# Patient Record
Sex: FEMALE | Race: BLACK OR AFRICAN AMERICAN | Employment: FULL TIME | URBAN - METROPOLITAN AREA
[De-identification: names, ages, dates, MRNs, and addresses within clinical notes are randomized per-mention and may not be internally consistent; named-entity substitution may affect disease eponyms.]

---

## 2017-12-21 ENCOUNTER — OFFICE VISIT (OUTPATIENT)
Dept: FAMILY MEDICINE CLINIC | Age: 47
End: 2017-12-21

## 2017-12-21 VITALS
WEIGHT: 194.8 LBS | OXYGEN SATURATION: 99 % | RESPIRATION RATE: 20 BRPM | SYSTOLIC BLOOD PRESSURE: 183 MMHG | HEIGHT: 67 IN | TEMPERATURE: 98.1 F | HEART RATE: 86 BPM | BODY MASS INDEX: 30.57 KG/M2 | DIASTOLIC BLOOD PRESSURE: 91 MMHG

## 2017-12-21 DIAGNOSIS — M54.2 NECK PAIN: ICD-10-CM

## 2017-12-21 DIAGNOSIS — M54.41 ACUTE BILATERAL LOW BACK PAIN WITH RIGHT-SIDED SCIATICA: ICD-10-CM
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
## 2017-12-21 DIAGNOSIS — R20.0 NUMBNESS AND TINGLING OF RIGHT ARM AND LEG: ICD-10-CM

## 2017-12-21 DIAGNOSIS — R20.2 NUMBNESS AND TINGLING OF RIGHT ARM AND LEG: ICD-10-CM

## 2017-12-21 PROBLEM — I10 ESSENTIAL HYPERTENSION: Status: ACTIVE | Noted: 2017-12-21

## 2017-12-21 RX ORDER — CLONIDINE 0.2 MG/24H
1 PATCH, EXTENDED RELEASE TRANSDERMAL
COMMUNITY
End: 2018-09-24 | Stop reason: SDUPTHER

## 2017-12-21 RX ORDER — CYCLOBENZAPRINE HCL 10 MG
TABLET ORAL
COMMUNITY
End: 2018-01-02 | Stop reason: SDUPTHER

## 2017-12-21 RX ORDER — MELOXICAM 15 MG/1
15 TABLET ORAL DAILY
Qty: 30 TAB | Refills: 0 | Status: SHIPPED | OUTPATIENT
Start: 2017-12-21 | End: 2018-01-02

## 2017-12-21 RX ORDER — TRAMADOL HYDROCHLORIDE 50 MG/1
50 TABLET ORAL
COMMUNITY
End: 2018-01-02 | Stop reason: SDUPTHER

## 2017-12-21 RX ORDER — NEBIVOLOL 10 MG/1
TABLET ORAL DAILY
COMMUNITY
End: 2018-09-24 | Stop reason: SDUPTHER

## 2017-12-21 NOTE — MR AVS SNAPSHOT
Visit Information Date & Time Provider Department Dept. Phone Encounter #  
 12/21/2017  1:40 PM Tarah Mcgill NP [facility] [phone] [id number] Upcoming Health Maintenance Date Due DTaP/Tdap/Td series (1 - Tdap) 2/19/1991 PAP AKA CERVICAL CYTOLOGY 2/19/1991 Influenza Age 5 to Adult 8/1/2017 Allergies as of 12/21/2017  Review Complete On: 12/21/2017 By: Dayo Cartagena LPN No Known Allergies Current Immunizations  Never Reviewed No immunizations on file. Not reviewed this visit You Were Diagnosed With   
  
 Codes Comments Acute bilateral low back pain with right-sided sciatica    -  Primary ICD-10-CM: M54.41 
ICD-9-CM: 724.2, 724.3, 338.19 Vitals BP Pulse Temp Resp Height(growth percentile) Weight(growth percentile) (!) 183/91 (BP 1 Location: Left arm, BP Patient Position: Sitting) 86 98.1 °F (36.7 °C) (Oral) 20 5' 6.5\" (1.689 m) 194 lb 12.8 oz (88.4 kg) LMP SpO2 BMI OB Status Smoking Status 09/10/2004 99% 30.97 kg/m2 Hysterectomy Never Smoker BMI and BSA Data Body Mass Index Body Surface Area 30.97 kg/m 2 2.04 m 2 Preferred Pharmacy Pharmacy Name Phone [id number] [phone] Your Updated Medication List  
  
   
This list is accurate as of: 12/21/17  2:45 PM.  Always use your most recent med list.  
  
  
  
  
 BYSTOLIC 10 mg tablet Generic drug:  nebivolol Take  by mouth daily. cloNIDine 0.2 mg/24 hr patch Commonly known as:  CATAPRES  
1 Patch by TransDERmal route Every Saturday. cyclobenzaprine 10 mg tablet Commonly known as:  FLEXERIL Take  by mouth three (3) times daily as needed for Muscle Spasm(s). meloxicam 15 mg tablet Commonly known as:  MOBIC Take 1 Tab by mouth daily. spironolactone 25 mg tablet Commonly known as:  ALDACTONE  
 Take 1 Tab by mouth two (2) times a day. For high blood pressure and low potassium  
  
 traMADol 50 mg tablet Commonly known as:  ULTRAM  
Take 50 mg by mouth every six (6) hours as needed for Pain. Prescriptions Sent to Pharmacy Refills  
 meloxicam (MOBIC) 15 mg tablet 0 Sig: Take 1 Tab by mouth daily. Class: Normal  
 Pharmacy: [facility] #: [phone] Route: Oral  
  
Patient Instructions 1) For acute pain, rest, intermittent application of cold packs (later, may switch to heat after 48 hours). Moist heat (such as shower, no dry heat packs) to affected area tid x 20 minutes. Can take 20 minute warm, epsom salt bath ([employer] carries a good one called Dr. Gwen Moscoso for about $6), if you are safe getting in and out of tub. 
 
2. Relative rest.  Avoid strenuous lifting, pushing, pulling. Avoid twisting and bending. 3. Back stretching and strengthening exercises. Please do the following exercises at least twice a day to help keep your back healthy (after your back is feeling better): Windmill stretch: Lie flat on back, arms extended from shoulders, bend one leg at knee, put bent knee over body, look opposite direction. Hold stretch for 10 seconds. Repeat on opposite side. Do this in morning - either while still in bed or when you first get up, on floor and again at night, before going to sleep. Lie on the back with both feet flat on the floor and both knees bent. Pull the right knee up to the chest, grasp the knee with the left hand and pull it towards the left shoulder and hold the stretch. Repeat for each side. Lie on the back with both feet flat on the floor and both knees bent. Rest the ankle of the right leg over the knee of the left leg. Pull the left thigh toward the chest and hold the stretch. Repeat for each side.  
 
4. You have a prescription for meloxicam (Mobic)  This is a prescription NSAID. Take 1 pill (15mg) daily in the morning with food to avoid stomach upset. Please do not take other NSAIDs, such as ibuprofen, while taking this medication. 5. Light duty next week when you return to work - no lifting more than 10 lbs; and no more than 20lbs for the following week so your muscles have time to heal. 
 
6. A mild TBI or concussion is an injury to the brain that may result after blunt force or an acceleration/deceleration head injury. You need to rest your brain. Please stop using electronics - such as computers, tv, phones - until the headache goes away. Get plenty of sleep and eat a healthy diet. Signs to watch for: 
Problems could arise over the first 24-48 hours. You should not be left alone and must go to a hospital at once it you: 1. Have a headache that gets worse. 2. Are very drowsy or cant be awakened (woken up). 3. Cant recognize people or places. 4. Have repeated vomiting 5. Behave unusually or seem confused; are very irritable. 6. Have seizures (arms and legs jerk uncontrollably). 7. Are unsteady on your feet; have slurred speech; vision or hearing changes. If you still have a headache that is 6-7/10 pain next week, please let me know and I will refer you to Dr. Too Sandoval for concussion protocol. Call or return to clinic if these symptoms worsen or fail to improve as anticipated. Introducing [facility]! Stacie Ardon introduces [employer] patient portal. Now you can access parts of your medical record, email your doctor's office, and request medication refills online. 1. In your internet browser, go to [email or web address] 2. Click on the First Time User? Click Here link in the Sign In box. You will see the New Member Sign Up page. 3. Enter your [employer] Access Code exactly as it appears below. You will not need to use this code after youve completed the sign-up process.  If you do not sign up before the expiration date, you must request a new code. · [employer] Access Code: [email or web address] Expires: 3/21/2018  2:45 PM 
 
4. Enter the last four digits of your Social Security Number (xxxx) and Date of Birth (mm/dd/yyyy) as indicated and click Submit. You will be taken to the next sign-up page. 5. Create a [employer] ID. This will be your [employer] login ID and cannot be changed, so think of one that is secure and easy to remember. 6. Create a [employer] password. You can change your password at any time. 7. Enter your Password Reset Question and Answer. This can be used at a later time if you forget your password. 8. Enter your e-mail address. You will receive e-mail notification when new information is available in [address] 9. Click Sign Up. You can now view and download portions of your medical record. 10. Click the Download Summary menu link to download a portable copy of your medical information. If you have questions, please visit the Frequently Asked Questions section of the [employer] website. Remember, [employer] is NOT to be used for urgent needs. For medical emergencies, dial 911. Now available from your iPhone and Android! Please provide this summary of care documentation to your next provider. Your primary care clinician is listed as Singh Odonnell. If you have any questions after today's visit, please call [phone].
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
## 2017-12-21 NOTE — LETTER
12/21/2017 2:41 PM 
 
 
Re: Chloe Allison To Whom It May Concern: 
 
Please excuse Chloe Allison from work through December 23, 2017. She was seen in our office today for a medical issue. 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 From 1/1/2018 - 1/5/2018, she should be on light duty, lifting no more than 20lbs of weight. Thank you for your assistance in this matter. Sincerely, Chula Mcfadden NP

## 2017-12-21 NOTE — PATIENT INSTRUCTIONS
1) For acute pain, rest, intermittent application of cold packs (later, may switch to heat after 48 hours). Moist heat (such as shower, no dry heat packs) to affected area tid x 20 minutes. Can take 20 minute warm, epsom salt bath ([employer] carries a good one called Dr. Duke Torres for about $6), if you are safe getting in and out of tub.    2. Relative rest.  Avoid strenuous lifting, pushing, pulling. Avoid twisting and bending. 3. Back stretching and strengthening exercises. Please do the following exercises at least twice a day to help keep your back healthy (after your back is feeling better): Windmill stretch: Lie flat on back, arms extended from shoulders, bend one leg at knee, put bent knee over body, look opposite direction. Hold stretch for 10 seconds. Repeat on opposite side. Do this in morning - either while still in bed or when you first get up, on floor and again at night, before going to sleep. Lie on the back with both feet flat on the floor and both knees bent. Pull the right knee up to the chest, grasp the knee with the left hand and pull it towards the left shoulder and hold the stretch. Repeat for each side. Lie on the back with both feet flat on the floor and both knees bent. Rest the ankle of the right leg over the knee of the left leg. Pull the left thigh toward the chest and hold the stretch. Repeat for each side. 4. You have a prescription for meloxicam (Mobic)  This is a prescription NSAID. Take 1 pill (15mg) daily in the morning with food to avoid stomach upset. Please do not take other NSAIDs, such as ibuprofen, while taking this medication. Stop taking Tramadol. 5. Light duty next week when you return to work - no lifting more than 10 lbs; and no more than 20lbs for the following week so your muscles have time to heal.    6. A mild TBI or concussion is an injury to the brain that may result after blunt force or an acceleration/deceleration head injury.     You need to rest your brain. Please stop using electronics - such as computers, tv, phones - until the headache goes away. Get plenty of sleep and eat a healthy diet. Signs to watch for:  Problems could arise over the first 24-48 hours. You should not be left alone and must go to a hospital at once it you:  1. Have a headache that gets worse. 2. Are very drowsy or cant be awakened (woken up). 3. Cant recognize people or places. 4. Have repeated vomiting  5. Behave unusually or seem confused; are very irritable. 6. Have seizures (arms and legs jerk uncontrollably). 7. Are unsteady on your feet; have slurred speech; vision or hearing changes. If you still have a headache that is 6-7/10 pain next week, please let me know and I will refer you to Dr. Faiza Morelos for concussion protocol. Call or return to clinic if these symptoms worsen or fail to improve as anticipated.

## 2018-01-02 ENCOUNTER — OFFICE VISIT (OUTPATIENT)
Dept: FAMILY MEDICINE CLINIC | Age: 48
End: 2018-01-02

## 2018-01-02 VITALS
HEIGHT: 67 IN | HEART RATE: 70 BPM | WEIGHT: 200.9 LBS | SYSTOLIC BLOOD PRESSURE: 189 MMHG | TEMPERATURE: 99.2 F | DIASTOLIC BLOOD PRESSURE: 102 MMHG | BODY MASS INDEX: 31.53 KG/M2 | OXYGEN SATURATION: 99 % | RESPIRATION RATE: 20 BRPM

## 2018-01-02 DIAGNOSIS — M54.41 ACUTE BILATERAL LOW BACK PAIN WITH RIGHT-SIDED SCIATICA: ICD-10-CM

## 2018-01-02 DIAGNOSIS — G44.311 INTRACTABLE ACUTE POST-TRAUMATIC HEADACHE: ICD-10-CM
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
## 2018-01-02 RX ORDER — CYCLOBENZAPRINE HCL 10 MG
10 TABLET ORAL
Qty: 30 TAB | Refills: 0 | Status: SHIPPED | OUTPATIENT
Start: 2018-01-02 | End: 2018-09-24 | Stop reason: ALTCHOICE

## 2018-01-02 RX ORDER — TRAMADOL HYDROCHLORIDE 50 MG/1
50 TABLET ORAL
Qty: 30 TAB | Refills: 0 | Status: SHIPPED | OUTPATIENT
Start: 2018-01-02 | End: 2018-09-24 | Stop reason: ALTCHOICE
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
## 2018-01-02 NOTE — LETTER
1/2/2018 4:20 PM 
 
 
Re: Agata Ferrari To Whom It May Concern: 
 
Agata Ferrari is under my medical care for a continuing issue. She will need to continue light duty at work from 1/2/2018 - 1/22/2018, meaning no lifting more than 10 lbs of weight and not bending or twisting. Thank you for your assistance in this matter. Sincerely,  SALINAS Pierce
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
## 2018-01-12 DIAGNOSIS — R53.83 FATIGUE, UNSPECIFIED TYPE: ICD-10-CM

## 2018-01-12 DIAGNOSIS — R42 DIZZINESS: Primary | ICD-10-CM

## 2018-03-19 ENCOUNTER — HOSPITAL ENCOUNTER (OUTPATIENT)
Dept: ULTRASOUND IMAGING | Age: 48
Discharge: HOME | End: 2018-03-19
Attending: ORTHOPAEDIC SURGERY
Payer: COMMERCIAL

## 2018-03-19 DIAGNOSIS — M79.89 SWELLING OF LIMB: ICD-10-CM

## 2018-03-19 PROCEDURE — 93970 EXTREMITY STUDY: CPT

## 2018-09-18 LAB — MAMMOGRAPHY, EXTERNAL: NORMAL

## 2018-09-24 ENCOUNTER — OFFICE VISIT (OUTPATIENT)
Dept: FAMILY MEDICINE CLINIC | Age: 48
End: 2018-09-24

## 2018-09-24 VITALS
BODY MASS INDEX: 33.12 KG/M2 | DIASTOLIC BLOOD PRESSURE: 90 MMHG | SYSTOLIC BLOOD PRESSURE: 158 MMHG | WEIGHT: 211 LBS | RESPIRATION RATE: 20 BRPM | HEART RATE: 88 BPM | TEMPERATURE: 98.1 F | HEIGHT: 67 IN

## 2018-09-24 DIAGNOSIS — M54.41 ACUTE BILATERAL LOW BACK PAIN WITH RIGHT-SIDED SCIATICA: ICD-10-CM

## 2018-09-24 DIAGNOSIS — E87.6 HYPOKALEMIA: ICD-10-CM

## 2018-09-24 DIAGNOSIS — I10 ESSENTIAL HYPERTENSION: ICD-10-CM

## 2018-09-24 RX ORDER — NEBIVOLOL 5 MG/1
10 TABLET ORAL DAILY
Qty: 180 TAB | Refills: 3 | Status: SHIPPED | OUTPATIENT
Start: 2018-09-24 | End: 2018-09-30

## 2018-09-24 RX ORDER — CLONIDINE 0.2 MG/24H
1 PATCH, EXTENDED RELEASE TRANSDERMAL
Qty: 12 PATCH | Refills: 3 | Status: SHIPPED | OUTPATIENT
Start: 2018-09-29

## 2018-09-24 RX ORDER — NEBIVOLOL 10 MG/1
10 TABLET ORAL DAILY
Qty: 90 TAB | Refills: 3 | Status: SHIPPED | OUTPATIENT
Start: 2018-09-24 | End: 2018-09-24 | Stop reason: SDUPTHER

## 2018-09-24 RX ORDER — SPIRONOLACTONE 25 MG/1
25 TABLET ORAL DAILY
Qty: 90 TAB | Refills: 1 | Status: SHIPPED | OUTPATIENT
Start: 2018-09-24 | End: 2019-09-12 | Stop reason: SDUPTHER

## 2018-09-24 NOTE — PATIENT INSTRUCTIONS
1) Healthy Weight  Body Mass Index is a noninvasive way to screen for weight and body fat. This is a mathematical calculation based on your height and weight. A healthy BMI is between 20% -24.9%. Your BMI is 33%. There is a relationship between high BMI and various healthy problems, such as osteoarthritis, muscle pain, increased risk of cancer, diabetes, heart disease, stroke, hypertension, high cholesterol, sleep apnea, breathing problems, depression, which is why a healthy BMI is so important. In terms of weight loss, a 5-10% reduction in body weight over 3-6 months is a reasonable goal.  There would likely be improvements in risk for disease such as diabetes and heart disease, with this amount of weight loss. In order to lose weight, try reducing your daily intake of calories by decreasing portion size of food and increase exercise to help reduce weight. Eat 3-5 small meals per day instead of 3 large meals. Choose lean meats for protein source which include chicken, pork, and turkey. The recommended serving size for protein is a 2-3 oz serving (the size of your fist), and 1-1.5 oz of carbohydrate per meal (about 1 cup). Increase servings of fruits and vegetables. Limit processed carbohydrates, (i.e. most breads, crackers, pasta, chips, rice, breaded or battered food, etc). If you choose to eat carbohydrates, whole wheat, (instead of white) is a healthier option for bread, rice, and crackers. Avoid fried foods. Limit sugar. Do your best to avoid all sweetened beverages, such as alcohol, juice, sodas or sweet teas, drink water, unsweet tea, diet soda, or crystal light as options instead. (Don't drink more than 2 of the 12oz artificially sweetened drinks per day as these can increase hunger and make it more difficult to lose weight. The daily goal for water intake should be at least 64 oz/day for most people. Fair Life milk is a healthy choice in milk products.  It is double filtered to remove much of the lactose (sugar found in milk) and recommended by trainers and nutritionists. There is 13 g of protein in a serving, so it is an easy way to increase your protein and calcium intake. Daily exercise will also help with weight loss and overall health. A minimum of 150 minutes a week of moderate exercise is recommended (30 minutes per day). Make exercise a routine part of your day - for example, park in spaces far away from [facility], take stairs instead of elevator, if sitting for long periods, get up from chair and walk every hour. Recruit a friend or relative to exercise with you and keep you on schedule. It is much easier to exercise with a ольга who will make sure you work out each day! Home DVDs can be a great way to work out, if you will do them (otherwise, they aren't worth the money!) APR is a eight week mixed martial arts (MMA) based workout. It has 5 main workout DVDs, each one is 45 minutes consisting of a 10 minute warm-up, five 5 minute workouts and a 6 minute stretching/cool down. It is easy to follow, with options to modify each move and you only need hand weights and some space to do the work out. Meal planning smart phone applications like [employer] can help plan healthy meals. Get 7-8 hours of sleep each night. For reliable dietary information, go to www. EATRIGHT.org.    You may wish to consider seeing the nutritionist at [facility] ([phone]), [facility] ([phone]) or [facility] ([phone]). Free smart phone application to help manage weight loss: MyFitnessPal = tracks food intake, exercise and weight. Daily nutritional summary. Meal        2) Bystolic 5mg pills are covered by insurance - so will change rx to 2 tabs of 5mg daily to total 25QO bystolic daily  Current therapy: bystolic 54OR daily + clonidie 0.2mg patch + spironolactone 25mg daily   (The spironolactone 25mg was written for twice a day. Monitor BP and if diastolic (bottom) number is increasing, take spironolactone 25mg twice a day.)    Please check your blood pressure through the week at staggered times in the day. (If you check in the morning, it should be at least one hour after your morning blood pressure medications.)      Arm monitors are most accurate. If you use a wrist monitor, make sure your wrist is at heart level. You can bring your home monitor to your next visit and have it calibrated with the machine in the office to gauge your readings. Sit  with your feet uncrossed and relax for 5 minutes before taking your BP. Keep a written record of your blood pressure readings and bring it to each appointment. If your systolic (top) blood pressure is consistently greater than 140mmHg or less than [id number] of the diastolic (bottom) number is consistently greater than 90mmHg or less than 60mmHg then please schedule an office appointment. Cardiac symptoms that would need immediate attention include: uncomfortable pressure, squeezing, fullness or pain in the center of your chest. Pain or discomfort in one or both arms, the back, neck, jaw or stomach. Shortness of breath with or without chest discomfort, breaking out in a cold sweat, nausea or lightheadedness. \ho           DASH Diet: Care Instructions  Your Care Instructions    The DASH diet is an eating plan that can help lower your blood pressure. DASH stands for Dietary Approaches to Stop Hypertension. Hypertension is high blood pressure. The DASH diet focuses on eating foods that are high in calcium, potassium, and magnesium. These nutrients can lower blood pressure. The foods that are highest in these nutrients are fruits, vegetables, low-fat dairy products, nuts, seeds, and legumes. But taking calcium, potassium, and magnesium supplements instead of eating foods that are high in those nutrients does not have the same effect.  The DASH diet also includes whole grains, fish, and poultry. The DASH diet is one of several lifestyle changes your doctor may recommend to lower your high blood pressure. Your doctor may also want you to decrease the amount of sodium in your diet. Lowering sodium while following the DASH diet can lower blood pressure even further than just the DASH diet alone. Follow-up care is a key part of your treatment and safety. Be sure to make and go to all appointments, and call your doctor if you are having problems. It's also a good idea to know your test results and keep a list of the medicines you take. How can you care for yourself at home? Following the DASH diet  · Eat 4 to 5 servings of fruit each day. A serving is 1 medium-sized piece of fruit, ½ cup chopped or canned fruit, 1/4 cup dried fruit, or 4 ounces (½ cup) of fruit juice. Choose fruit more often than fruit juice. · Eat 4 to 5 servings of vegetables each day. A serving is 1 cup of lettuce or raw leafy vegetables, ½ cup of chopped or cooked vegetables, or 4 ounces (½ cup) of vegetable juice. Choose vegetables more often than vegetable juice. · Get 2 to 3 servings of low-fat and fat-free dairy each day. A serving is 8 ounces of milk, 1 cup of yogurt, or 1 ½ ounces of cheese. · Eat 6 to 8 servings of grains each day. A serving is 1 slice of bread, 1 ounce of dry cereal, or ½ cup of cooked rice, pasta, or cooked cereal. Try to choose whole-grain products as much as possible. · Limit lean meat, poultry, and fish to 2 servings each day. A serving is 3 ounces, about the size of a deck of cards. · Eat 4 to 5 servings of nuts, seeds, and legumes (cooked dried beans, lentils, and split peas) each week. A serving is 1/3 cup of nuts, 2 tablespoons of seeds, or ½ cup of cooked beans or peas. · Limit fats and oils to 2 to 3 servings each day. A serving is 1 teaspoon of vegetable oil or 2 tablespoons of salad dressing. · Limit sweets and added sugars to 5 servings or less a week.  A serving is 1 tablespoon jelly or jam, ½ cup sorbet, or 1 cup of lemonade. · Eat less than 2,300 milligrams (mg) of sodium a day. If you limit your sodium to 1,500 mg a day, you can lower your blood pressure even more. Tips for success  · Start small. Do not try to make dramatic changes to your diet all at once. You might feel that you are missing out on your favorite foods and then be more likely to not follow the plan. Make small changes, and stick with them. Once those changes become habit, add a few more changes. · Try some of the following:  ¨ Make it a goal to eat a fruit or vegetable at every meal and at snacks. This will make it easy to get the recommended amount of fruits and vegetables each day. ¨ Try yogurt topped with fruit and nuts for a snack or healthy dessert. ¨ Add lettuce, tomato, cucumber, and onion to sandwiches. ¨ Combine a ready-made pizza crust with low-fat mozzarella cheese and lots of vegetable toppings. Try using tomatoes, squash, spinach, broccoli, carrots, cauliflower, and onions. ¨ Have a variety of cut-up vegetables with a low-fat dip as an appetizer instead of chips and dip. ¨ Sprinkle sunflower seeds or chopped almonds over salads. Or try adding chopped walnuts or almonds to cooked vegetables. ¨ Try some vegetarian meals using beans and peas. Add garbanzo or kidney beans to salads. Make burritos and tacos with mashed downey beans or black beans. Where can you learn more? Go to http://rishabh-saul.info/. Enter R894 in the search box to learn more about \"DASH Diet: Care Instructions. \"  Current as of: December 6, 2017  Content Version: 11.7  © 4375-4572 [employer]. Care instructions adapted under license by [employer] (which disclaims liability or warranty for this information).  If you have questions about a medical condition or this instruction, always ask your healthcare professional. [place] [address] any warranty or liability for your use of this information.
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
## 2018-09-24 NOTE — MR AVS SNAPSHOT
[facility] 
 
 
 [address] [facility] 
[address] [place] [address] 
[phone] Patient: Christianne García MRN:  [id number] Visit Information Date & Time Provider Department Dept. Phone Encounter #  
 9/24/2018  2:40 PM Sury Hidalgo NP [facility] [phone] [id number] Follow-up Instructions Return if symptoms worsen or fail to improve. Upcoming Health Maintenance Date Due DTaP/Tdap/Td series (1 - Tdap) 2/19/1991 PAP AKA CERVICAL CYTOLOGY 2/19/1991 Influenza Age 5 to Adult 8/1/2018 Allergies as of 9/24/2018  Review Complete On: 9/24/2018 By: Jase Ashby LPN Severity Noted Reaction Type Reactions Mobic [Meloxicam] Low 01/02/2018    Itching Current Immunizations  Never Reviewed No immunizations on file. Not reviewed this visit You Were Diagnosed With   
  
 Codes Comments Acute bilateral low back pain with right-sided sciatica     ICD-10-CM: M54.41 
ICD-9-CM: 724.2, 724.3, 338.19 Essential hypertension     ICD-10-CM: I10 
ICD-9-CM: 401.9 Hypokalemia     ICD-10-CM: E87.6 ICD-9-CM: 276.8 Vitals BP Pulse Temp Resp Height(growth percentile) Weight(growth percentile) 158/90 (BP 1 Location: Left arm, BP Patient Position: Sitting) 88 98.1 °F (36.7 °C) (Oral) 20 5' 6.5\" (1.689 m) 211 lb (95.7 kg) LMP BMI OB Status Smoking Status 09/10/2004 33.55 kg/m2 Hysterectomy Never Smoker BMI and BSA Data Body Mass Index Body Surface Area  
 33.55 kg/m 2 2.12 m 2 Preferred Pharmacy Pharmacy Name Phone [facility]/PHARMACY #[id number] Rashaad Moore [phone] Your Updated Medication List  
  
   
This list is accurate as of 9/24/18  3:22 PM.  Always use your most recent med list.  
  
  
  
  
 cloNIDine 0.2 mg/24 hr patch Commonly known as:  CATAPRES  
1 Patch by TransDERmal route Every Saturday. Start taking on:  2018  
  
 cyclobenzaprine 10 mg tablet Commonly known as:  FLEXERIL Take 1 Tab by mouth three (3) times daily as needed for Muscle Spasm(s). nebivolol 5 mg tablet Commonly known as:  BYSTOLIC Take 2 Tabs by mouth daily. spironolactone 25 mg tablet Commonly known as:  ALDACTONE Take 1 Tab by mouth daily. For high blood pressure and low potassium  
  
 traMADol 50 mg tablet Commonly known as:  ULTRAM  
Take 1 Tab by mouth every six (6) hours as needed for Pain. Max Daily Amount: 200 mg. Indications: Pain Prescriptions Sent to Pharmacy Refills  
 cloNIDine (CATAPRES) 0.2 mg/24 hr patch 3 Starting on: 2018 Si Patch by TransDERmal route Every Saturday. Class: Normal  
 Pharmacy: [facility]/pharmacy #[id number] [facility], [address] [place] [address] Ph #: [phone] Route: TransDERmal  
 spironolactone (ALDACTONE) 25 mg tablet 1 Sig: Take 1 Tab by mouth daily. For high blood pressure and low potassium Class: Normal  
 Pharmacy: [facility]/pharmacy #[id number] [facility], [address] [place] [address] Ph #: [phone] Route: Oral  
 nebivolol (BYSTOLIC) 5 mg tablet 3 Sig: Take 2 Tabs by mouth daily. Class: Normal  
 Pharmacy: [facility]/pharmacy #[id number] [facility], [address] [place] [address] Ph #: [phone] Route: Oral  
  
Follow-up Instructions Return if symptoms worsen or fail to improve. Patient Instructions 1) Healthy Weight Body Mass Index is a noninvasive way to screen for weight and body fat. This is a mathematical calculation based on your height and weight. A healthy BMI is between 20% -24.9%. Your BMI is 33%.   There is a relationship between high BMI and various healthy problems, such as osteoarthritis, muscle pain, increased risk of cancer, diabetes, heart disease, stroke, hypertension, high cholesterol, sleep apnea, breathing problems, depression, which is why a healthy BMI is so important. In terms of weight loss, a 5-10% reduction in body weight over 3-6 months is a reasonable goal.  There would likely be improvements in risk for disease such as diabetes and heart disease, with this amount of weight loss. In order to lose weight, try reducing your daily intake of calories by decreasing portion size of food and increase exercise to help reduce weight. Eat 3-5 small meals per day instead of 3 large meals. Choose lean meats for protein source which include chicken, pork, and turkey. The recommended serving size for protein is a 2-3 oz serving (the size of your fist), and 1-1.5 oz of carbohydrate per meal (about 1 cup). Increase servings of fruits and vegetables. Limit processed carbohydrates, (i.e. most breads, crackers, pasta, chips, rice, breaded or battered food, etc). If you choose to eat carbohydrates, whole wheat, (instead of white) is a healthier option for bread, rice, and crackers. Avoid fried foods. Limit sugar. Do your best to avoid all sweetened beverages, such as alcohol, juice, sodas or sweet teas, drink water, unsweet tea, diet soda, or crystal light as options instead. (Don't drink more than 2 of the 12oz artificially sweetened drinks per day as these can increase hunger and make it more difficult to lose weight. The daily goal for water intake should be at least 64 oz/day for most people. Fair Life milk is a healthy choice in milk products. It is double filtered to remove much of the lactose (sugar found in milk) and recommended by trainers and nutritionists. There is 13 g of protein in a serving, so it is an easy way to increase your protein and calcium intake. Daily exercise will also help with weight loss and overall health. A minimum of 150 minutes a week of moderate exercise is recommended (30 minutes per day).   Make exercise a routine part of your day - for example, park in spaces far away from [facility], take stairs instead of elevator, if sitting for long periods, get up from chair and walk every hour. Recruit a friend or relative to exercise with you and keep you on schedule. It is much easier to exercise with a ольга who will make sure you work out each day! Home DVDs can be a great way to work out, if you will do them (otherwise, they aren't worth the money!) YouFolio is a eight week mixed martial arts (MMA) based workout. It has 5 main workout DVDs, each one is 45 minutes consisting of a 10 minute warm-up, five 5 minute workouts and a 6 minute stretching/cool down. It is easy to follow, with options to modify each move and you only need hand weights and some space to do the work out. Meal planning smart phone applications like [employer] can help plan healthy meals. Get 7-8 hours of sleep each night. For reliable dietary information, go to www. EATRIGHT.org. 
 
You may wish to consider seeing the nutritionist at [facility] ([phone]), [facility] ([phone]) or [place] ([phone]). Free smart phone application to help manage weight loss: MyFitnessPal = tracks food intake, exercise and weight. Daily nutritional summary. Meal  2) Bystolic 5mg pills are covered by insurance - so will change rx to 2 tabs of 5mg daily to total 90CA bystolic daily Current therapy: bystolic 47WL daily + clonidie 0.2mg patch + spironolactone 25mg daily (The spironolactone 25mg was written for twice a day. Monitor BP and if diastolic (bottom) number is increasing, take spironolactone 25mg twice a day.) Please check your blood pressure through the week at staggered times in the day. (If you check in the morning, it should be at least one hour after your morning blood pressure medications.) Arm monitors are most accurate.   If you use a wrist monitor, make sure your wrist is at heart level. You can bring your home monitor to your next visit and have it calibrated with the machine in the office to gauge your readings. Sit  with your feet uncrossed and relax for 5 minutes before taking your BP. Keep a written record of your blood pressure readings and bring it to each appointment. If your systolic (top) blood pressure is consistently greater than 140mmHg or less than 213TKGV of the diastolic (bottom) number is consistently greater than 90mmHg or less than 60mmHg then please schedule an office appointment. Cardiac symptoms that would need immediate attention include: uncomfortable pressure, squeezing, fullness or pain in the center of your chest. Pain or discomfort in one or both arms, the back, neck, jaw or stomach. Shortness of breath with or without chest discomfort, breaking out in a cold sweat, nausea or lightheadedness. \ho 
 
 
 
  
 
1. In your internet browser, go to [email or web address] 2. Click on the First Time User? Click Here link in the Sign In box. You will see the New Member Sign Up page. 3. Enter your Skin Scan Access Code exactly as it appears below. You will not need to use this code after youve completed the sign-up process. If you do not sign up before the expiration date, you must request a new code. · Skin Scan Access Code: [email or web address] Expires: 12/23/2018  3:22 PM 
 
4. Enter the last four digits of your Social Security Number (xxxx) and Date of Birth (mm/dd/yyyy) as indicated and click Submit. You will be taken to the next sign-up page. 5. Create a mySocietyt ID. This will be your Skin Scan login ID and cannot be changed, so think of one that is secure and easy to remember. 6. Create a Skin Scan password. You can change your password at any time. 7. Enter your Password Reset Question and Answer. This can be used at a later time if you forget your password. 8. Enter your e-mail address. You will receive e-mail notification when new information is available in [address] 9. Click Sign Up. You can now view and download portions of your medical record. 10. Click the Download Summary menu link to download a portable copy of your medical information. If you have questions, please visit the Frequently Asked Questions section of the Skin Scan website. Remember, Skin Scan is NOT to be used for urgent needs. For medical emergencies, dial 911. Now available from your iPhone and Android! Please provide this summary of care documentation to your next provider. Your primary care clinician is listed as Charlene Schafer. If you have any questions after today's visit, please call [phone].

## 2018-09-24 NOTE — PROGRESS NOTES
Chief Complaint   Patient presents with    Blood Pressure  [facility]  Identified pt with two pt identifiers(name and ). Chief Complaint   Patient presents with    Blood Pressure Check       1. Have you been to the ER, urgent care clinic since your last visit?  n Hospitalized since your last visit? n    2. Have you seen or consulted any other health care providers outside of the [address] [place] [address] since your last visit? Y   Include any pap smears or colon screening. n      Advance Care Planning    In the event something were to happen to you and you were unable to speak on your behalf, do you have an Advance Directive/ Living Will in place stating your wishes? NO    If yes, do we have a copy on file  /NO    If no, would you like information YES    Medication reconciliation up to date and corrected with patient at this time. y    Today's provider has been notified of reason for visit, vitals and flowsheets obtained on patients.  y  y  Reviewed record in preparation for visit, huddled with provider and have obtained necessary documentation.y      Health Maintenance Due   Topic    DTaP/Tdap/Td series (1 - Tdap)    PAP AKA CERVICAL CYTOLOGY     Influenza Age 5 to Adult        Wt Readings from Last 3 Encounters:   18 211 lb (95.7 kg)   18 200 lb 14.4 oz (91.1 kg)   17 194 lb 12.8 oz (88.4 kg)     Temp Readings from Last 3 Encounters:   18 98.1 °F (36.7 °C) (Oral)   18 99.2 °F (37.3 °C) (Oral)   17 98.1 °F (36.7 °C) (Oral)     BP Readings from Last 3 Encounters:   18 158/90   18 (!) 189/102   17 (!) 183/91     Pulse Readings from Last 3 Encounters:   18 88   18 70   17 86     Vitals:    18 1449   BP: 158/90   Pulse: 88   Resp: 20   Temp: 98.1 °F (36.7 °C)   TempSrc: Oral   Weight: 211 lb (95.7 kg)   Height: 5' 6.5\" (1.689 m)   PainSc:   0 - No pain   LMP: 09/10/2004         Learning Assessment:  :     Learning Assessment 9/24/2018 4/15/2015   PRIMARY LEARNER Patient Patient   HIGHEST LEVEL OF EDUCATION - PRIMARY LEARNER  GRADUATED HIGH SCHOOL OR GED GRADUATED HIGH SCHOOL OR GED   BARRIERS PRIMARY LEARNER NONE NONE   CO-LEARNER CAREGIVER No No   PRIMARY LANGUAGE ENGLISH ENGLISH   LEARNER PREFERENCE PRIMARY LISTENING LISTENING   ANSWERED BY patient Patient   RELATIONSHIP SELF SELF       Depression Screening:  :     PHQ over the last two weeks 9/24/2018   Little interest or pleasure in doing things Not at all   Feeling down, depressed, irritable, or hopeless Not at all   Total Score PHQ 2 0       Fall Risk Assessment:  :     No flowsheet data found. Abuse Screening:  :     No flowsheet data found. ADL Screening:  :     ADL Assessment 9/24/2018   Feeding yourself No Help Needed   Getting from bed to chair No Help Needed   Getting dressed No Help Needed   Bathing or showering No Help Needed   Walk across the room (includes cane/walker) No Help Needed   Using the telphone No Help Needed   Taking your medications No Help Needed   Preparing meals No Help Needed   Managing money (expenses/bills) No Help Needed   Moderately strenuous housework (laundry) No Help Needed   Shopping for personal items (toiletries/medicines) No Help Needed   Shopping for groceries No Help Needed   Driving No Help Needed   Climbing a flight of stairs No Help Needed   Getting to places beyond walking distances No Help Needed                 Medication reconciliation up to date and corrected with patient at this time.

## 2018-09-25 ENCOUNTER — TELEPHONE (OUTPATIENT)
Dept: FAMILY MEDICINE CLINIC | Age: 48
End: 2018-09-25

## 2018-09-25 NOTE — TELEPHONE ENCOUNTER
Patient called into the office stated that she was seen yesterday, 9/24/18 and was prescribed Bystolic, but it's too expensive would like to know if an alternative can be sent to the pharmacy.   P: [phone]

## 2018-09-28 NOTE — TELEPHONE ENCOUNTER
----- Message from Jacki Rainey sent at 9/28/2018  9:42 AM EDT -----  Regarding: Nick/Rx  Pt stated the Rx for her BP medication Bysolitic was to costly and she is requesting a different medication called to [facility].Pts number is [phone].

## 2018-09-30 DIAGNOSIS — I10 ESSENTIAL HYPERTENSION: Primary | ICD-10-CM

## 2018-09-30 RX ORDER — ATENOLOL 50 MG/1
50 TABLET ORAL DAILY
Qty: 30 TAB | Refills: 1 | Status: SHIPPED | OUTPATIENT
Start: 2018-09-30

## 2018-10-01 NOTE — TELEPHONE ENCOUNTER
Writer called patient to notify her of medication changes and request from [facility] to schedule an appointment within 2 weeks for a BP check and to her BP log with her. Patient did not answer. Writer left  requesting phone call back.

## 2018-10-01 NOTE — TELEPHONE ENCOUNTER
Patient called back into office. Writer spoke with patient. Patient verified . Writer notified patient of medication change from Rodger Chance, to make an appointment in 2 weeks for a BP check to make sure new medication is working properly, and  To bring her BP log with her. Patient verified understanding and appreciation. Stated that she will call the office back to make an appointment.

## 2022-03-18 PROBLEM — I10 ESSENTIAL HYPERTENSION: Status: ACTIVE | Noted: 2017-12-21

## 2022-11-29 ENCOUNTER — HOSPITAL ENCOUNTER (OUTPATIENT)
Dept: PREADMISSION TESTING | Age: 52
Discharge: HOME | End: 2022-11-29
Payer: COMMERCIAL

## 2022-11-29 VITALS
DIASTOLIC BLOOD PRESSURE: 79 MMHG | TEMPERATURE: 97.1 F | WEIGHT: 231.48 LBS | BODY MASS INDEX: 37.2 KG/M2 | SYSTOLIC BLOOD PRESSURE: 164 MMHG | HEIGHT: 66 IN | HEART RATE: 72 BPM | OXYGEN SATURATION: 98 % | RESPIRATION RATE: 18 BRPM

## 2022-11-29 LAB
25(OH)D3 SERPL-MCNC: 26.2 NG/ML (ref 30–100)
ABO + RH BLD: NORMAL
ALBUMIN SERPL-MCNC: 3.7 G/DL (ref 3.5–5)
ALBUMIN/GLOB SERPL: 1 {RATIO} (ref 1.1–2.2)
ALP SERPL-CCNC: 85 U/L (ref 45–117)
ALT SERPL-CCNC: 19 U/L (ref 12–78)
ANION GAP SERPL CALC-SCNC: 9 MMOL/L (ref 5–15)
APPEARANCE UR: CLEAR
APTT PPP: 30 SEC (ref 22.1–31)
AST SERPL-CCNC: 15 U/L (ref 15–37)
ATRIAL RATE: 65 BPM
BACTERIA URNS QL MICRO: ABNORMAL /HPF
BASOPHILS # BLD: 0 K/UL (ref 0–0.1)
BASOPHILS NFR BLD: 0 % (ref 0–1)
BILIRUB SERPL-MCNC: 0.5 MG/DL (ref 0.2–1)
BILIRUB UR QL: NEGATIVE
BLOOD GROUP ANTIBODIES SERPL: NORMAL
BUN SERPL-MCNC: 20 MG/DL (ref 6–20)
BUN/CREAT SERPL: 16 (ref 12–20)
CALCIUM SERPL-MCNC: 9.1 MG/DL (ref 8.5–10.1)
CALCULATED P AXIS, ECG09: 59 DEGREES
CALCULATED R AXIS, ECG10: 35 DEGREES
CALCULATED T AXIS, ECG11: 147 DEGREES
CHLORIDE SERPL-SCNC: 103 MMOL/L (ref 97–108)
CO2 SERPL-SCNC: 28 MMOL/L (ref 21–32)
COLOR UR: ABNORMAL
CREAT SERPL-MCNC: 1.27 MG/DL (ref 0.55–1.02)
DIAGNOSIS, 93000: NORMAL
DIFFERENTIAL METHOD BLD: ABNORMAL
EOSINOPHIL # BLD: 0 K/UL (ref 0–0.4)
EOSINOPHIL NFR BLD: 1 % (ref 0–7)
EPITH CASTS URNS QL MICRO: ABNORMAL /LPF
ERYTHROCYTE [DISTWIDTH] IN BLOOD BY AUTOMATED COUNT: 14 % (ref 11.5–14.5)
EST. AVERAGE GLUCOSE BLD GHB EST-MCNC: 117 MG/DL
GLOBULIN SER CALC-MCNC: 3.8 G/DL (ref 2–4)
GLUCOSE SERPL-MCNC: 109 MG/DL (ref 65–100)
GLUCOSE UR STRIP.AUTO-MCNC: NEGATIVE MG/DL
HBA1C MFR BLD: 5.7 % (ref 4–5.6)
HCT VFR BLD AUTO: 39.1 % (ref 35–47)
HGB BLD-MCNC: 12.4 G/DL (ref 11.5–16)
HGB UR QL STRIP: NEGATIVE
IMM GRANULOCYTES # BLD AUTO: 0 K/UL (ref 0–0.04)
IMM GRANULOCYTES NFR BLD AUTO: 0 % (ref 0–0.5)
INR PPP: 1 (ref 0.9–1.1)
KETONES UR QL STRIP.AUTO: NEGATIVE MG/DL
LEUKOCYTE ESTERASE UR QL STRIP.AUTO: NEGATIVE
LYMPHOCYTES # BLD: 1.6 K/UL (ref 0.8–3.5)
LYMPHOCYTES NFR BLD: 23 % (ref 12–49)
MCH RBC QN AUTO: 25.3 PG (ref 26–34)
MCHC RBC AUTO-ENTMCNC: 31.7 G/DL (ref 30–36.5)
MCV RBC AUTO: 79.6 FL (ref 80–99)
MONOCYTES # BLD: 0.6 K/UL (ref 0–1)
MONOCYTES NFR BLD: 9 % (ref 5–13)
NEUTS SEG # BLD: 4.5 K/UL (ref 1.8–8)
NEUTS SEG NFR BLD: 67 % (ref 32–75)
NITRITE UR QL STRIP.AUTO: NEGATIVE
NRBC # BLD: 0 K/UL (ref 0–0.01)
NRBC BLD-RTO: 0 PER 100 WBC
P-R INTERVAL, ECG05: 200 MS
PH UR STRIP: 6.5 [PH] (ref 5–8)
PLATELET # BLD AUTO: 375 K/UL (ref 150–400)
PMV BLD AUTO: 9 FL (ref 8.9–12.9)
POTASSIUM SERPL-SCNC: 3.5 MMOL/L (ref 3.5–5.1)
PROT SERPL-MCNC: 7.5 G/DL (ref 6.4–8.2)
PROT UR STRIP-MCNC: NEGATIVE MG/DL
PROTHROMBIN TIME: 10.4 SEC (ref 9–11.1)
Q-T INTERVAL, ECG07: 428 MS
QRS DURATION, ECG06: 94 MS
QTC CALCULATION (BEZET), ECG08: 445 MS
RBC # BLD AUTO: 4.91 M/UL (ref 3.8–5.2)
RBC #/AREA URNS HPF: ABNORMAL /HPF (ref 0–5)
SODIUM SERPL-SCNC: 140 MMOL/L (ref 136–145)
SP GR UR REFRACTOMETRY: 1.01 (ref 1–1.03)
SPECIMEN EXP DATE BLD: NORMAL
THERAPEUTIC RANGE,PTTT: NORMAL SECS (ref 58–77)
UA: UC IF INDICATED,UAUC: ABNORMAL
UROBILINOGEN UR QL STRIP.AUTO: 0.2 EU/DL (ref 0.2–1)
VENTRICULAR RATE, ECG03: 65 BPM
WBC # BLD AUTO: 6.8 K/UL (ref 3.6–11)
WBC URNS QL MICRO: ABNORMAL /HPF (ref 0–4)

## 2022-11-29 PROCEDURE — 81001 URINALYSIS AUTO W/SCOPE: CPT

## 2022-11-29 PROCEDURE — 85610 PROTHROMBIN TIME: CPT

## 2022-11-29 PROCEDURE — 82306 VITAMIN D 25 HYDROXY: CPT

## 2022-11-29 PROCEDURE — 85025 COMPLETE CBC W/AUTO DIFF WBC: CPT

## 2022-11-29 PROCEDURE — 93005 ELECTROCARDIOGRAM TRACING: CPT

## 2022-11-29 PROCEDURE — 80053 COMPREHEN METABOLIC PANEL: CPT

## 2022-11-29 PROCEDURE — 36415 COLL VENOUS BLD VENIPUNCTURE: CPT

## 2022-11-29 PROCEDURE — 85730 THROMBOPLASTIN TIME PARTIAL: CPT

## 2022-11-29 PROCEDURE — 86900 BLOOD TYPING SEROLOGIC ABO: CPT

## 2022-11-29 PROCEDURE — 83036 HEMOGLOBIN GLYCOSYLATED A1C: CPT

## 2022-11-29 RX ORDER — DILTIAZEM HYDROCHLORIDE 120 MG/1
120 CAPSULE, EXTENDED RELEASE ORAL DAILY
COMMUNITY

## 2022-11-29 RX ORDER — PREGABALIN 150 MG/1
150 CAPSULE ORAL 3 TIMES DAILY
COMMUNITY
End: 2022-11-29

## 2022-11-29 RX ORDER — HYDRALAZINE HYDROCHLORIDE 25 MG/1
25 TABLET, FILM COATED ORAL 2 TIMES DAILY
COMMUNITY

## 2022-11-29 RX ORDER — LABETALOL 200 MG/1
200 TABLET, FILM COATED ORAL 2 TIMES DAILY
COMMUNITY

## 2022-11-29 RX ORDER — NITROGLYCERIN 0.3 MG/1
0.3 TABLET SUBLINGUAL
COMMUNITY

## 2022-11-29 RX ORDER — LOSARTAN POTASSIUM 100 MG/1
100 TABLET ORAL DAILY
COMMUNITY

## 2022-11-29 RX ORDER — CHLORTHALIDONE 25 MG/1
25 TABLET ORAL DAILY
COMMUNITY

## 2022-11-29 RX ORDER — ATORVASTATIN CALCIUM 20 MG/1
TABLET, FILM COATED ORAL
COMMUNITY

## 2022-11-29 RX ORDER — ASPIRIN 81 MG/1
TABLET ORAL DAILY
COMMUNITY

## 2022-11-29 RX ORDER — AMLODIPINE BESYLATE 5 MG/1
5 TABLET ORAL DAILY
COMMUNITY

## 2022-11-29 NOTE — PERIOP NOTES
Cardiology:  request fax'd to [facility] for Last Office Note and Aspirin Plan (we already have [address]).

## 2022-11-29 NOTE — PERIOP NOTES
[address] [address] [facility]   MAIN OR                                  [phone]   MAIN PRE OP                          [phone]                                                                                AMBULATORY PRE OP          [phone]  PRE-ADMISSION TESTING    [phone]   Surgery Date:  Tuesday 12/13       Is surgery arrival time given by surgeon? YES  NO  If NO, [facility] staff will call you between 3 and 7pm the day before your surgery with your arrival time. (If your surgery is on a Monday, we will call you the Friday before.)    Call [phone] after 7pm Monday-Friday if you did not receive this call. INSTRUCTIONS BEFORE YOUR SURGERY   When You  Arrive Arrive at the 2nd [address] [facility] on the day of your surgery  Have your insurance card, photo ID, and any copayment (if needed)   Food   and   Drink NO food or drink after midnight the night before surgery    This means NO water, gum, mints, coffee, juice, etc.  No alcohol (beer, wine, liquor) 24 hours before and after surgery   Medications to   TAKE   Morning of Surgery MEDICATIONS TO TAKE THE MORNING OF SURGERY WITH A SIP OF WATER:   Amlodipine  Tiadylt  Labetalol     Medications  To  STOP      7 days before surgery Non-Steroidal anti-inflammatory Drugs (NSAID's): for example, Ibuprofen (Advil, Motrin), Naproxen (Aleve)  Aspirin, if taking for pain   Herbal supplements, vitamins, and fish oil  Other:  (Pain medications not listed above, including Tylenol may be taken)   Blood  Thinners If you take  Aspirin, Plavix, Coumadin, or any blood-thinning or anti-blood clot medicine, talk to the doctor who prescribed the medications for pre-operative instructions. Last dose Brilinta Wed. Dec 7th. As per your Cardiol, Last dose of Aspirin Monday Dec 5th.    Bathing Clothing  Jewelry  Valuables     If you shower the morning of surgery, please do not apply anything to your skin (lotions, powders, deodorant, or makeup, especially mascara)  Follow Chlorhexidine [employer] body wash instructions provided to you during PAT appointment. Begin 3 days prior to surgery. Do not shave or trim anywhere 24 hours before surgery  Wear your hair loose or down; no pony-tails, buns, or metal hair clips  Wear loose, comfortable, clean clothes  Wear glasses instead of contacts  Leave money, valuables, and jewelry, including body piercings, at home  If you were given an [employer], bring it on day of surgery. Going Home - or Spending the Night SAME-DAY SURGERY: You must have a responsible adult drive you home and stay with you 24 hours after surgery  ADMITS: If your doctor is keeping you in the hospital after surgery, leave personal belongings/luggage in your car until you have a hospital room number. Hospital discharge time is 12 noon  Drivers must be here before 12 noon unless you are told differently   Special Instructions DO NOT TAKE: Losartan on Day of Surgery  We will confirm your Aspirin Plan with your Cardiologist (we have [address]) and call you if different from above. Follow all instructions so your surgery wont be cancelled. Please, be on time. If a situation occurs and you are delayed the day of surgery, call [phone] or [phone]. If your physical condition changes (like a fever, cold, flu, etc.) call your surgeon. Home medication(s) reviewed and verified via     LIST   VERBAL   during PAT appointment. The patient was contacted by      IN-PERSON  The patient verbalizes understanding of all instructions and      DOES NOT   need reinforcement.

## 2022-11-30 LAB
BACTERIA SPEC CULT: NORMAL
BACTERIA SPEC CULT: NORMAL
SERVICE CMNT-IMP: NORMAL

## 2022-11-30 NOTE — PERIOP NOTES
Called for last OVN and EKG from Dr. Quintin Chairez for comparison to 11/29 EKG. Dr. Briana Jackman reviewed EKG from 6/22 and recent EKG. No further action required.

## 2022-12-08 NOTE — PERIOP NOTES
Call to Juan BLAND's office, H&P is not available at this time but will be faxed to PAT later this afternoon once complete.

## 2022-12-12 ENCOUNTER — ANESTHESIA EVENT (OUTPATIENT)
Dept: SURGERY | Age: 52
DRG: 304 | End: 2022-12-12
Payer: MEDICAID

## 2022-12-13 ENCOUNTER — APPOINTMENT (OUTPATIENT)
Dept: GENERAL RADIOLOGY | Age: 52
DRG: 304 | End: 2022-12-13
Attending: ORTHOPAEDIC SURGERY
Payer: MEDICAID

## 2022-12-13 ENCOUNTER — HOSPITAL ENCOUNTER (INPATIENT)
Age: 52
LOS: 4 days | Discharge: HOME HEALTH | DRG: 304 | End: 2022-12-17
Attending: ORTHOPAEDIC SURGERY | Admitting: ORTHOPAEDIC SURGERY
Payer: MEDICAID

## 2022-12-13 ENCOUNTER — ANESTHESIA (OUTPATIENT)
Dept: SURGERY | Age: 52
DRG: 304 | End: 2022-12-13
Payer: MEDICAID

## 2022-12-13 DIAGNOSIS — Z98.1 STATUS POST LUMBAR SPINAL FUSION: Primary | ICD-10-CM

## 2022-12-13 DIAGNOSIS — G89.18 POSTOPERATIVE PAIN: ICD-10-CM

## 2022-12-13 PROBLEM — M48.061 LUMBAR STENOSIS: Status: ACTIVE | Noted: 2022-12-13

## 2022-12-13 PROCEDURE — 2709999900 HC NON-CHARGEABLE SUPPLY: Performed by: ORTHOPAEDIC SURGERY

## 2022-12-13 PROCEDURE — 77030005513 HC CATH URETH FOL11 MDII -B: Performed by: ORTHOPAEDIC SURGERY

## 2022-12-13 PROCEDURE — 74011250636 HC RX REV CODE- 250/636: Performed by: ORTHOPAEDIC SURGERY

## 2022-12-13 PROCEDURE — 77030012406 HC DRN WND PENRS BARD -A: Performed by: ORTHOPAEDIC SURGERY

## 2022-12-13 PROCEDURE — 77030010507 HC ADH SKN DERMBND J&J -B: Performed by: ORTHOPAEDIC SURGERY

## 2022-12-13 PROCEDURE — C1713 ANCHOR/SCREW BN/BN,TIS/BN: HCPCS | Performed by: ORTHOPAEDIC SURGERY

## 2022-12-13 PROCEDURE — C9290 INJ, BUPIVACAINE LIPOSOME: HCPCS | Performed by: ORTHOPAEDIC SURGERY

## 2022-12-13 PROCEDURE — 77030038692 HC WND DEB SYS IRMX -B: Performed by: ORTHOPAEDIC SURGERY

## 2022-12-13 PROCEDURE — 0ST20ZZ RESECTION OF LUMBAR VERTEBRAL DISC, OPEN APPROACH: ICD-10-PCS | Performed by: ORTHOPAEDIC SURGERY

## 2022-12-13 PROCEDURE — C1762 CONN TISS, HUMAN(INC FASCIA): HCPCS | Performed by: ORTHOPAEDIC SURGERY

## 2022-12-13 PROCEDURE — 74011250636 HC RX REV CODE- 250/636: Performed by: ANESTHESIOLOGY

## 2022-12-13 PROCEDURE — 0SG00AJ FUSION OF LUMBAR VERTEBRAL JOINT WITH INTERBODY FUSION DEVICE, POSTERIOR APPROACH, ANTERIOR COLUMN, OPEN APPROACH: ICD-10-PCS | Performed by: ORTHOPAEDIC SURGERY

## 2022-12-13 PROCEDURE — 74011000250 HC RX REV CODE- 250: Performed by: NURSE ANESTHETIST, CERTIFIED REGISTERED

## 2022-12-13 PROCEDURE — 77030008684 HC TU ET CUF COVD -B: Performed by: NURSE ANESTHETIST, CERTIFIED REGISTERED

## 2022-12-13 PROCEDURE — 77030013079 HC BLNKT BAIR HGGR 3M -A: Performed by: NURSE ANESTHETIST, CERTIFIED REGISTERED

## 2022-12-13 PROCEDURE — 0SG0071 FUSION OF LUMBAR VERTEBRAL JOINT WITH AUTOLOGOUS TISSUE SUBSTITUTE, POSTERIOR APPROACH, POSTERIOR COLUMN, OPEN APPROACH: ICD-10-PCS | Performed by: ORTHOPAEDIC SURGERY

## 2022-12-13 PROCEDURE — C1821 INTERSPINOUS IMPLANT: HCPCS | Performed by: ORTHOPAEDIC SURGERY

## 2022-12-13 PROCEDURE — 77030033067 HC SUT PDO STRATFX SPIR J&J -B: Performed by: ORTHOPAEDIC SURGERY

## 2022-12-13 PROCEDURE — 77030026188 HC BN CANC CHP CRSH PR LIFV -E: Performed by: ORTHOPAEDIC SURGERY

## 2022-12-13 PROCEDURE — 76210000017 HC OR PH I REC 1.5 TO 2 HR: Performed by: ORTHOPAEDIC SURGERY

## 2022-12-13 PROCEDURE — 76010000174 HC OR TIME 3.5 TO 4 HR INTENSV-TIER 1: Performed by: ORTHOPAEDIC SURGERY

## 2022-12-13 PROCEDURE — 74011250637 HC RX REV CODE- 250/637: Performed by: ORTHOPAEDIC SURGERY

## 2022-12-13 PROCEDURE — 77030026438 HC STYL ET INTUB CARD -A: Performed by: NURSE ANESTHETIST, CERTIFIED REGISTERED

## 2022-12-13 PROCEDURE — 74011250636 HC RX REV CODE- 250/636: Performed by: NURSE ANESTHETIST, CERTIFIED REGISTERED

## 2022-12-13 PROCEDURE — 65270000029 HC RM PRIVATE

## 2022-12-13 PROCEDURE — 77030034475 HC MISC IMPL SPN: Performed by: ORTHOPAEDIC SURGERY

## 2022-12-13 PROCEDURE — 77030004391 HC BUR FLUT MEDT -C: Performed by: ORTHOPAEDIC SURGERY

## 2022-12-13 PROCEDURE — 77030020268 HC MISC GENERAL SUPPLY: Performed by: ORTHOPAEDIC SURGERY

## 2022-12-13 PROCEDURE — 01NB0ZZ RELEASE LUMBAR NERVE, OPEN APPROACH: ICD-10-PCS | Performed by: ORTHOPAEDIC SURGERY

## 2022-12-13 PROCEDURE — 77030028271 HC SRGFL HEMSTAT MTRX KT J&J -C: Performed by: ORTHOPAEDIC SURGERY

## 2022-12-13 PROCEDURE — 77030002982 HC SUT POLYSRB J&J -A: Performed by: ORTHOPAEDIC SURGERY

## 2022-12-13 PROCEDURE — 74011000250 HC RX REV CODE- 250: Performed by: ORTHOPAEDIC SURGERY

## 2022-12-13 PROCEDURE — C1768 GRAFT, VASCULAR: HCPCS | Performed by: ORTHOPAEDIC SURGERY

## 2022-12-13 PROCEDURE — 77030031139 HC SUT VCRL2 J&J -A: Performed by: ORTHOPAEDIC SURGERY

## 2022-12-13 PROCEDURE — 76060000038 HC ANESTHESIA 3.5 TO 4 HR: Performed by: ORTHOPAEDIC SURGERY

## 2022-12-13 DEVICE — SCR SET SPNE STREAMLINE TL --: Type: IMPLANTABLE DEVICE | Site: SPINE LUMBAR | Status: FUNCTIONAL

## 2022-12-13 DEVICE — SCREW SPNL L50MM DIA6.5MM THORLUM TI ALLY POLYAX STREAMLINE: Type: IMPLANTABLE DEVICE | Site: SPINE LUMBAR | Status: FUNCTIONAL

## 2022-12-13 DEVICE — INTEGRA® DURAFLEX™ SUTURABLE DURAL GRAFT 4 CM X 5 CM
Type: IMPLANTABLE DEVICE | Site: SPINE LUMBAR | Status: FUNCTIONAL
Brand: INTEGRA® DURAFLEX®

## 2022-12-13 DEVICE — BONE CHIP CANC CRSH 1-8MM 30ML --: Type: IMPLANTABLE DEVICE | Site: SPINE LUMBAR | Status: FUNCTIONAL

## 2022-12-13 DEVICE — SCREW SPNL L45MM DIA6.5MM 60DEG THORLUM PEDCL TI ALLY: Type: IMPLANTABLE DEVICE | Site: SPINE LUMBAR | Status: FUNCTIONAL

## 2022-12-13 DEVICE — SPACER SPNL 12X22 MM BULL TIP IF PEEK VBR: Type: IMPLANTABLE DEVICE | Site: SPINE LUMBAR | Status: FUNCTIONAL

## 2022-12-13 DEVICE — IMPLANTABLE DEVICE: Type: IMPLANTABLE DEVICE | Site: SPINE LUMBAR | Status: FUNCTIONAL

## 2022-12-13 DEVICE — ALLOGRAFT BNE MOLD 10 CC VIABLE BNE MTRX VIBONE: Type: IMPLANTABLE DEVICE | Site: SPINE LUMBAR | Status: FUNCTIONAL

## 2022-12-13 RX ORDER — FAMOTIDINE 20 MG/1
20 TABLET, FILM COATED ORAL 2 TIMES DAILY
Status: DISCONTINUED | OUTPATIENT
Start: 2022-12-13 | End: 2022-12-17 | Stop reason: HOSPADM

## 2022-12-13 RX ORDER — POVIDONE-IODINE 10 %
SOLUTION, NON-ORAL TOPICAL AS NEEDED
Status: DISCONTINUED | OUTPATIENT
Start: 2022-12-13 | End: 2022-12-13 | Stop reason: HOSPADM

## 2022-12-13 RX ORDER — CYCLOBENZAPRINE HCL 10 MG
10 TABLET ORAL
Status: DISCONTINUED | OUTPATIENT
Start: 2022-12-13 | End: 2022-12-17 | Stop reason: HOSPADM

## 2022-12-13 RX ORDER — DILTIAZEM HYDROCHLORIDE 120 MG/1
120 CAPSULE, COATED, EXTENDED RELEASE ORAL DAILY
Status: DISCONTINUED | OUTPATIENT
Start: 2022-12-14 | End: 2022-12-17 | Stop reason: HOSPADM

## 2022-12-13 RX ORDER — FACIAL-BODY WIPES
10 EACH TOPICAL DAILY PRN
Status: DISCONTINUED | OUTPATIENT
Start: 2022-12-15 | End: 2022-12-17 | Stop reason: HOSPADM

## 2022-12-13 RX ORDER — FLUMAZENIL 0.1 MG/ML
0.2 INJECTION INTRAVENOUS
Status: DISCONTINUED | OUTPATIENT
Start: 2022-12-13 | End: 2022-12-13 | Stop reason: HOSPADM

## 2022-12-13 RX ORDER — LABETALOL 100 MG/1
200 TABLET, FILM COATED ORAL 2 TIMES DAILY
Status: DISCONTINUED | OUTPATIENT
Start: 2022-12-13 | End: 2022-12-17 | Stop reason: HOSPADM

## 2022-12-13 RX ORDER — AMLODIPINE BESYLATE 5 MG/1
5 TABLET ORAL DAILY
Status: DISCONTINUED | OUTPATIENT
Start: 2022-12-14 | End: 2022-12-17 | Stop reason: HOSPADM

## 2022-12-13 RX ORDER — NEOSTIGMINE METHYLSULFATE 1 MG/ML
INJECTION, SOLUTION INTRAVENOUS AS NEEDED
Status: DISCONTINUED | OUTPATIENT
Start: 2022-12-13 | End: 2022-12-13 | Stop reason: HOSPADM

## 2022-12-13 RX ORDER — ONDANSETRON 2 MG/ML
INJECTION INTRAMUSCULAR; INTRAVENOUS AS NEEDED
Status: DISCONTINUED | OUTPATIENT
Start: 2022-12-13 | End: 2022-12-13 | Stop reason: HOSPADM

## 2022-12-13 RX ORDER — HYDROMORPHONE HYDROCHLORIDE 2 MG/ML
INJECTION, SOLUTION INTRAMUSCULAR; INTRAVENOUS; SUBCUTANEOUS AS NEEDED
Status: DISCONTINUED | OUTPATIENT
Start: 2022-12-13 | End: 2022-12-13 | Stop reason: HOSPADM

## 2022-12-13 RX ORDER — LOSARTAN POTASSIUM 50 MG/1
100 TABLET ORAL DAILY
Status: DISCONTINUED | OUTPATIENT
Start: 2022-12-14 | End: 2022-12-17 | Stop reason: HOSPADM

## 2022-12-13 RX ORDER — MIDAZOLAM HYDROCHLORIDE 1 MG/ML
INJECTION, SOLUTION INTRAMUSCULAR; INTRAVENOUS AS NEEDED
Status: DISCONTINUED | OUTPATIENT
Start: 2022-12-13 | End: 2022-12-13 | Stop reason: HOSPADM

## 2022-12-13 RX ORDER — LIDOCAINE HYDROCHLORIDE 10 MG/ML
0.1 INJECTION, SOLUTION EPIDURAL; INFILTRATION; INTRACAUDAL; PERINEURAL AS NEEDED
Status: DISCONTINUED | OUTPATIENT
Start: 2022-12-13 | End: 2022-12-13 | Stop reason: HOSPADM

## 2022-12-13 RX ORDER — LABETALOL HYDROCHLORIDE 5 MG/ML
INJECTION, SOLUTION INTRAVENOUS AS NEEDED
Status: DISCONTINUED | OUTPATIENT
Start: 2022-12-13 | End: 2022-12-13 | Stop reason: HOSPADM

## 2022-12-13 RX ORDER — AMOXICILLIN 250 MG
1 CAPSULE ORAL 2 TIMES DAILY
Status: DISCONTINUED | OUTPATIENT
Start: 2022-12-13 | End: 2022-12-17 | Stop reason: HOSPADM

## 2022-12-13 RX ORDER — SODIUM CHLORIDE, SODIUM LACTATE, POTASSIUM CHLORIDE, CALCIUM CHLORIDE 600; 310; 30; 20 MG/100ML; MG/100ML; MG/100ML; MG/100ML
125 INJECTION, SOLUTION INTRAVENOUS CONTINUOUS
Status: DISCONTINUED | OUTPATIENT
Start: 2022-12-13 | End: 2022-12-13 | Stop reason: HOSPADM

## 2022-12-13 RX ORDER — OXYCODONE HYDROCHLORIDE 5 MG/1
5 TABLET ORAL
Status: DISCONTINUED | OUTPATIENT
Start: 2022-12-13 | End: 2022-12-15

## 2022-12-13 RX ORDER — SODIUM CHLORIDE 9 MG/ML
125 INJECTION, SOLUTION INTRAVENOUS CONTINUOUS
Status: DISPENSED | OUTPATIENT
Start: 2022-12-13 | End: 2022-12-15

## 2022-12-13 RX ORDER — DEXAMETHASONE SODIUM PHOSPHATE 4 MG/ML
INJECTION, SOLUTION INTRA-ARTICULAR; INTRALESIONAL; INTRAMUSCULAR; INTRAVENOUS; SOFT TISSUE AS NEEDED
Status: DISCONTINUED | OUTPATIENT
Start: 2022-12-13 | End: 2022-12-13 | Stop reason: HOSPADM

## 2022-12-13 RX ORDER — FENTANYL CITRATE 50 UG/ML
INJECTION, SOLUTION INTRAMUSCULAR; INTRAVENOUS AS NEEDED
Status: DISCONTINUED | OUTPATIENT
Start: 2022-12-13 | End: 2022-12-13 | Stop reason: HOSPADM

## 2022-12-13 RX ORDER — ROCURONIUM BROMIDE 10 MG/ML
INJECTION, SOLUTION INTRAVENOUS AS NEEDED
Status: DISCONTINUED | OUTPATIENT
Start: 2022-12-13 | End: 2022-12-13 | Stop reason: HOSPADM

## 2022-12-13 RX ORDER — ATORVASTATIN CALCIUM 20 MG/1
20 TABLET, FILM COATED ORAL
Status: DISCONTINUED | OUTPATIENT
Start: 2022-12-13 | End: 2022-12-17 | Stop reason: HOSPADM

## 2022-12-13 RX ORDER — HYDRALAZINE HYDROCHLORIDE 25 MG/1
25 TABLET, FILM COATED ORAL 2 TIMES DAILY
Status: DISCONTINUED | OUTPATIENT
Start: 2022-12-13 | End: 2022-12-17 | Stop reason: HOSPADM

## 2022-12-13 RX ORDER — NALOXONE HYDROCHLORIDE 0.4 MG/ML
0.2 INJECTION, SOLUTION INTRAMUSCULAR; INTRAVENOUS; SUBCUTANEOUS
Status: DISCONTINUED | OUTPATIENT
Start: 2022-12-13 | End: 2022-12-13 | Stop reason: HOSPADM

## 2022-12-13 RX ORDER — HYDROMORPHONE HYDROCHLORIDE 1 MG/ML
0.5 INJECTION, SOLUTION INTRAMUSCULAR; INTRAVENOUS; SUBCUTANEOUS
Status: ACTIVE | OUTPATIENT
Start: 2022-12-13 | End: 2022-12-14

## 2022-12-13 RX ORDER — SODIUM CHLORIDE 0.9 % (FLUSH) 0.9 %
5-40 SYRINGE (ML) INJECTION AS NEEDED
Status: DISCONTINUED | OUTPATIENT
Start: 2022-12-13 | End: 2022-12-17 | Stop reason: HOSPADM

## 2022-12-13 RX ORDER — SUCCINYLCHOLINE CHLORIDE 20 MG/ML
INJECTION INTRAMUSCULAR; INTRAVENOUS AS NEEDED
Status: DISCONTINUED | OUTPATIENT
Start: 2022-12-13 | End: 2022-12-13 | Stop reason: HOSPADM

## 2022-12-13 RX ORDER — ACETAMINOPHEN 325 MG/1
650 TABLET ORAL
Status: DISCONTINUED | OUTPATIENT
Start: 2022-12-13 | End: 2022-12-17 | Stop reason: HOSPADM

## 2022-12-13 RX ORDER — ONDANSETRON 2 MG/ML
4 INJECTION INTRAMUSCULAR; INTRAVENOUS
Status: DISPENSED | OUTPATIENT
Start: 2022-12-13 | End: 2022-12-14

## 2022-12-13 RX ORDER — HYDROMORPHONE HYDROCHLORIDE 1 MG/ML
.25-1 INJECTION, SOLUTION INTRAMUSCULAR; INTRAVENOUS; SUBCUTANEOUS
Status: DISCONTINUED | OUTPATIENT
Start: 2022-12-13 | End: 2022-12-13 | Stop reason: HOSPADM

## 2022-12-13 RX ORDER — NALOXONE HYDROCHLORIDE 0.4 MG/ML
0.4 INJECTION, SOLUTION INTRAMUSCULAR; INTRAVENOUS; SUBCUTANEOUS AS NEEDED
Status: DISCONTINUED | OUTPATIENT
Start: 2022-12-13 | End: 2022-12-17 | Stop reason: HOSPADM

## 2022-12-13 RX ORDER — CHLORTHALIDONE 25 MG/1
25 TABLET ORAL DAILY
Status: DISCONTINUED | OUTPATIENT
Start: 2022-12-14 | End: 2022-12-17 | Stop reason: HOSPADM

## 2022-12-13 RX ORDER — VANCOMYCIN HYDROCHLORIDE 1 G/20ML
INJECTION, POWDER, LYOPHILIZED, FOR SOLUTION INTRAVENOUS AS NEEDED
Status: DISCONTINUED | OUTPATIENT
Start: 2022-12-13 | End: 2022-12-13 | Stop reason: HOSPADM

## 2022-12-13 RX ORDER — PROPOFOL 10 MG/ML
INJECTION, EMULSION INTRAVENOUS AS NEEDED
Status: DISCONTINUED | OUTPATIENT
Start: 2022-12-13 | End: 2022-12-13 | Stop reason: HOSPADM

## 2022-12-13 RX ORDER — POLYETHYLENE GLYCOL 3350 17 G/17G
17 POWDER, FOR SOLUTION ORAL DAILY
Status: DISCONTINUED | OUTPATIENT
Start: 2022-12-14 | End: 2022-12-17 | Stop reason: HOSPADM

## 2022-12-13 RX ORDER — OXYCODONE HYDROCHLORIDE 5 MG/1
10 TABLET ORAL
Status: DISCONTINUED | OUTPATIENT
Start: 2022-12-13 | End: 2022-12-15

## 2022-12-13 RX ORDER — SODIUM CHLORIDE 0.9 % (FLUSH) 0.9 %
5-40 SYRINGE (ML) INJECTION EVERY 8 HOURS
Status: DISCONTINUED | OUTPATIENT
Start: 2022-12-13 | End: 2022-12-17 | Stop reason: HOSPADM

## 2022-12-13 RX ORDER — DIPHENHYDRAMINE HYDROCHLORIDE 50 MG/ML
12.5 INJECTION, SOLUTION INTRAMUSCULAR; INTRAVENOUS AS NEEDED
Status: DISCONTINUED | OUTPATIENT
Start: 2022-12-13 | End: 2022-12-13 | Stop reason: HOSPADM

## 2022-12-13 RX ORDER — GLYCOPYRROLATE 0.2 MG/ML
INJECTION INTRAMUSCULAR; INTRAVENOUS AS NEEDED
Status: DISCONTINUED | OUTPATIENT
Start: 2022-12-13 | End: 2022-12-13 | Stop reason: HOSPADM

## 2022-12-13 RX ORDER — DIPHENHYDRAMINE HYDROCHLORIDE 50 MG/ML
12.5 INJECTION, SOLUTION INTRAMUSCULAR; INTRAVENOUS
Status: DISCONTINUED | OUTPATIENT
Start: 2022-12-13 | End: 2022-12-17 | Stop reason: HOSPADM

## 2022-12-13 RX ADMIN — FENTANYL CITRATE 50 MCG: 50 INJECTION, SOLUTION INTRAMUSCULAR; INTRAVENOUS at 13:59

## 2022-12-13 RX ADMIN — LABETALOL HYDROCHLORIDE 5 MG: 5 INJECTION INTRAVENOUS at 14:53

## 2022-12-13 RX ADMIN — SENNOSIDES AND DOCUSATE SODIUM 1 TABLET: 50; 8.6 TABLET ORAL at 22:00

## 2022-12-13 RX ADMIN — HYDROMORPHONE HYDROCHLORIDE 0.5 MG: 2 INJECTION, SOLUTION INTRAMUSCULAR; INTRAVENOUS; SUBCUTANEOUS at 14:48

## 2022-12-13 RX ADMIN — ROCURONIUM BROMIDE 5 MG: 10 INJECTION INTRAVENOUS at 14:01

## 2022-12-13 RX ADMIN — HYDROMORPHONE HYDROCHLORIDE 0.5 MG: 2 INJECTION, SOLUTION INTRAMUSCULAR; INTRAVENOUS; SUBCUTANEOUS at 14:42

## 2022-12-13 RX ADMIN — DEXAMETHASONE SODIUM PHOSPHATE 8 MG: 4 INJECTION, SOLUTION INTRAMUSCULAR; INTRAVENOUS at 14:13

## 2022-12-13 RX ADMIN — ROCURONIUM BROMIDE 25 MG: 10 INJECTION INTRAVENOUS at 14:14

## 2022-12-13 RX ADMIN — ONDANSETRON HYDROCHLORIDE 4 MG: 2 SOLUTION INTRAMUSCULAR; INTRAVENOUS at 16:34

## 2022-12-13 RX ADMIN — SODIUM CHLORIDE, POTASSIUM CHLORIDE, SODIUM LACTATE AND CALCIUM CHLORIDE: 600; 310; 30; 20 INJECTION, SOLUTION INTRAVENOUS at 15:15

## 2022-12-13 RX ADMIN — PROPOFOL 50 MG: 10 INJECTION, EMULSION INTRAVENOUS at 14:04

## 2022-12-13 RX ADMIN — SODIUM CHLORIDE, POTASSIUM CHLORIDE, SODIUM LACTATE AND CALCIUM CHLORIDE 125 ML/HR: 600; 310; 30; 20 INJECTION, SOLUTION INTRAVENOUS at 10:41

## 2022-12-13 RX ADMIN — CEFAZOLIN SODIUM 2 G: 1 POWDER, FOR SOLUTION INTRAMUSCULAR; INTRAVENOUS at 14:20

## 2022-12-13 RX ADMIN — Medication: at 19:50

## 2022-12-13 RX ADMIN — PROPOFOL 125 MCG/KG/MIN: 10 INJECTION, EMULSION INTRAVENOUS at 14:24

## 2022-12-13 RX ADMIN — NEOSTIGMINE METHYLSULFATE 3 MG: 1 INJECTION, SOLUTION INTRAVENOUS at 15:23

## 2022-12-13 RX ADMIN — ATORVASTATIN CALCIUM 20 MG: 20 TABLET, FILM COATED ORAL at 22:00

## 2022-12-13 RX ADMIN — GLYCOPYRROLATE 0.6 MG: 0.2 INJECTION INTRAMUSCULAR; INTRAVENOUS at 15:22

## 2022-12-13 RX ADMIN — HYDROMORPHONE HYDROCHLORIDE 0.5 MG: 1 INJECTION, SOLUTION INTRAMUSCULAR; INTRAVENOUS; SUBCUTANEOUS at 18:31

## 2022-12-13 RX ADMIN — HYDROMORPHONE HYDROCHLORIDE 0.5 MG: 1 INJECTION, SOLUTION INTRAMUSCULAR; INTRAVENOUS; SUBCUTANEOUS at 17:55

## 2022-12-13 RX ADMIN — FENTANYL CITRATE 50 MCG: 50 INJECTION, SOLUTION INTRAMUSCULAR; INTRAVENOUS at 14:04

## 2022-12-13 RX ADMIN — CEFAZOLIN 2 G: 1 INJECTION, POWDER, FOR SOLUTION INTRAMUSCULAR; INTRAVENOUS at 22:00

## 2022-12-13 RX ADMIN — FAMOTIDINE 20 MG: 20 TABLET, FILM COATED ORAL at 22:00

## 2022-12-13 RX ADMIN — SUCCINYLCHOLINE CHLORIDE 140 MG: 20 INJECTION, SOLUTION INTRAMUSCULAR; INTRAVENOUS at 14:02

## 2022-12-13 RX ADMIN — PROPOFOL 180 MG: 10 INJECTION, EMULSION INTRAVENOUS at 14:01

## 2022-12-13 RX ADMIN — Medication 10 ML: at 22:01

## 2022-12-13 RX ADMIN — OXYCODONE 10 MG: 5 TABLET ORAL at 22:00

## 2022-12-13 RX ADMIN — MIDAZOLAM HYDROCHLORIDE 2 MG: 1 INJECTION, SOLUTION INTRAMUSCULAR; INTRAVENOUS at 13:55

## 2022-12-13 RX ADMIN — HYDROMORPHONE HYDROCHLORIDE 0.5 MG: 1 INJECTION, SOLUTION INTRAMUSCULAR; INTRAVENOUS; SUBCUTANEOUS at 18:52

## 2022-12-13 RX ADMIN — HYDROMORPHONE HYDROCHLORIDE 0.5 MG: 1 INJECTION, SOLUTION INTRAMUSCULAR; INTRAVENOUS; SUBCUTANEOUS at 18:05

## 2022-12-13 RX ADMIN — SODIUM CHLORIDE 125 ML/HR: 9 INJECTION, SOLUTION INTRAVENOUS at 19:49

## 2022-12-13 NOTE — ANESTHESIA PREPROCEDURE EVALUATION
Relevant Problems   No relevant active problems       Anesthetic History   No history of anesthetic complications            Review of Systems / Medical History  Patient summary reviewed and pertinent labs reviewed    Pulmonary  Within defined limits                 Neuro/Psych         Psychiatric history     Cardiovascular    Hypertension          Past MI (2020), CAD, cardiac stents (x2 in 2020) and hyperlipidemia    Exercise tolerance: >4 METS  Comments: Normal stress test 9/2021    TTE 2/2022   GI/Hepatic/Renal         Renal disease: CRI       Endo/Other             Other Findings              Physical Exam    Airway  Mallampati: II  TM Distance: 4 - 6 cm  Neck ROM: normal range of motion   Mouth opening: Normal     Cardiovascular    Rhythm: regular  Rate: normal         Dental    Dentition: Lower dentition intact and Full upper dentures     Pulmonary  Breath sounds clear to auscultation               Abdominal         Other Findings            Anesthetic Plan    ASA: 3  Anesthesia type: general          Induction: Intravenous  Anesthetic plan and risks discussed with: Patient

## 2022-12-13 NOTE — H&P
Date of Surgery Update:  Irina Johnson was seen and examined. History and physical has been reviewed. The patient has been examined.  There have been no significant clinical changes since the completion of the originally dated History and Physical.    Signed By: Nolvia Avila MD     December 13, 2022 12:30 PM

## 2022-12-13 NOTE — OP NOTES
Operative Note    Patient: Bhavesh Juárez  YOB: 1970  MRN: [id number]    Date of Procedure: 12/13/2022     Pre-Op Diagnosis: Pain, lumbar region [M54.50]  Spondylolisthesis of lumbar region [M43.16]  Spinal stenosis, lumbar region, with neurogenic claudication [M48.062]    Post-Op Diagnosis: Same as preoperative diagnosis. Procedure(s):  L4-L5 LAMINECTOMY /FUSION / INSTRUMENTATION/TRANSFORAMINAL LUMBAR INTERBODY FUSION    Surgeon(s):  Zaire Decker MD    Surgical Assistant: Physician Assistant: EDWINA Parmar  Surg Asst-1: Jinny Barone    Anesthesia: General     Estimated Blood Loss (mL):  537     Complications: None    Specimens: * No specimens in log *     Implants:   Implant Name Type Inv.  Item Serial No.  Lot No. LRB No. Used Action   ViBone Viable Bone Matrix   [id number] RTI SURGICAL N/A N/A 1 Implanted   BONE CHIP CANC CRSH 1-8MM 30ML --  - [id number]  BONE CHIP CANC [address] 1-8MM 30ML --  [id number] [facility] TISSUE BANK N/A N/A 1 Implanted   GRAFT DURA 4X5 CM SUTURABLE BCNVX BOV PERICARD DURAGN - [id number]  GRAFT DURA 4X5 CM SUTURABLE BCNVX BOV PERICARD DURAGN [id number] [employer] [id number] N/A 1 Implanted   SPACER SPNL 12X22 MM BULL TIP IF PEEK VBR - SNA  SPACER SPNL 12X22 MM BULL TIP IF PEEK VBR NA [employer]WD [id number] N/A 1 Implanted   SCR SET SPNE STREAMLINE TL --  - SNA  SCR SET SPNE STREAMLINE TL --  NA [employer] NA N/A 4 Implanted   SCREW SPNL L50MM DIA6.5MM THORLUM TI ALLY POLYAX STREAMLINE - SNA  SCREW SPNL L50MM DIA6.5MM THORLUM TI ALLY POLYAX STREAMLINE NA [employer] NA N/A 2 Implanted   SCREW SPNL L45MM DIA6.5MM 60DEG THORLUM PEDCL TI ALLY - SNA  SCREW SPNL L45MM DIA6.5MM 60DEG THORLUM PEDCL TI ALLY NA [employer] NA N/A 2 Implanted   BROOKE SPNE PREBENT 5.5X40MM TI -- STREAMLINE TL - SNA  BROOKE SPNE PREBENT 5.5X40MM TI -- STREAMLINE TL NA [employer] NA N/A 2 Implanted       Drains:   Hemovac Posterior Back (Active)       Findings: as above    Detailed Description of Procedure: [address]    OPERATIVE REPORT      NAME: Charli Jackson    AGE: 46 y.o. YOB: 1970    MEDICAL RECORD NUMBER: [id number]    DATE OF SURGERY: 12/13/2022    PREOPERATIVE DIAGNOSES:  1. Lumbar stenosis. 2. Acquired lumbar spondylolisthesis. POSTOPERATIVE DIAGNOSES:  1. Lumbar stenosis. 2. Acquired lumbar spondylolisthesis. OPERATIVE PROCEDURES:  1. Laminectomy, partial facetectomy, and foraminotomy of L4 and L5.   2. Posterolateral fusion and posterior lumbar interbody fusion of L4-5. 3. Pedicle screw instrumentation with RTI pedicle screws for L4 and L5 bilaterally. 4. Application of RTI biomechanical intervertebral body device at L4-5   5. Morselized allograft for spine surgery and local autograft for spine surgery with stem cells. SURGEON: Estrella Davila MD     ASSISTANT: EDWINA Khanna    ANESTHESIA: General    ESTIMATED BLOOD LOSS:  280 cc    COMPLICATIONS: None apparent    Specimens - no    Implants - see above    NEUROMONITORING: We used SSEPs and spontaneous EMGs with pedicle screw stimulation    INDICATION: The patient is a very pleasant 46 y.o. female with debilitating leg pain and back pain. She failed conservative measures. She elected to proceed with operative intervention. She was aware of the risks, benefits, and alternatives. She provided informed consent. DESCRIPTION OF PROCEDURE: The patient was identified in the preoperative holding area. The lumbar spine was marked by me. She was transferred to the operating room where general anesthesia was given. She was also given perioperative ancef antibiotics. [facility]. All bony prominences were well padded.  The lumbar spine was prepped and draped in the usual standard fashion. We performed a surgical time out. I made a skin incision from L3 to L5. I exposed the posterior lumbar spine in standard fashion. I took intraoperative fluoroscopy to verify our levels. I exposed the transverse processes of L4 and L5 bilaterally. I then began my decompression by performing a laminectomy of L4. I also performed a partial facetectomy and foraminotomy to decompress the thecal sac and nerve roots of L4. I performed a laminectomy of L5. I performed a partial facetectomy bilaterally and foraminotomy to decompress the thecal sac and traversing L5 nerve roots. I decompressed the stenosis found within the foramen and lateral to the foramen for L4-L5. At this point our transforaminal approach to L5 on the right side was complete with exposure of the right L4-5 disc space. I then performed a standard discectomy at L4-5. I prepared the endplates to bleeding bone. I performed trial sizing. I placed a biomechanical device into L4-5 with the appropriate amount of tension and alignment. I placed bone graft into the biomechanical device. I then cannulated our pedicles for L4 and L5 bilaterally in standard fashion. I probed each pedicle. I copiously irrigated the entire wound. I decorticated our fusion beds bilaterally with a high-speed jennifer. I placed our bone graft into our fusions beds bilaterally. I then placed pedicle screws for L4 and L5 bilaterally in standard fashion under fluoroscopic guidance. I had good purchase for each pedicle. I then stimulated all 4 pedicle screws with pedicle screw stimulation. The pedicle screws were found to be within the appropriate range of amplitude. I then placed contoured rods on top of the pedicles bilaterally. The rods were locked to the pedicle screws according to the 's specification with set screws. We had good hemostasis. There was no CSF leaking.  The fusion beds were packed with morselized allograft and local autograft. The exposed neurologic elements were protected with Duragen. I injected the soft tissues with a pain management cocktail. A deep drain was placed. The wound was closed in layers. A sterile dressing was applied. The patient was extubated and transferred to the recovery room in good medical condition. The PA assisted with retraction and wound closure. I, Dr. Walt Napoles, performed the above procedures.      Walt Napoles MD  12/13/2022      Electronically Signed by Walt Napoles MD on 12/13/2022 at 4:22 PM

## 2022-12-14 LAB
ANION GAP SERPL CALC-SCNC: 9 MMOL/L (ref 5–15)
BUN SERPL-MCNC: 19 MG/DL (ref 6–20)
BUN/CREAT SERPL: 12 (ref 12–20)
CALCIUM SERPL-MCNC: 8.4 MG/DL (ref 8.5–10.1)
CHLORIDE SERPL-SCNC: 104 MMOL/L (ref 97–108)
CO2 SERPL-SCNC: 25 MMOL/L (ref 21–32)
CREAT SERPL-MCNC: 1.64 MG/DL (ref 0.55–1.02)
GLUCOSE SERPL-MCNC: 171 MG/DL (ref 65–100)
HGB BLD-MCNC: 11.2 G/DL (ref 11.5–16)
POTASSIUM SERPL-SCNC: 4.2 MMOL/L (ref 3.5–5.1)
SODIUM SERPL-SCNC: 138 MMOL/L (ref 136–145)

## 2022-12-14 PROCEDURE — 74011250637 HC RX REV CODE- 250/637: Performed by: ORTHOPAEDIC SURGERY

## 2022-12-14 PROCEDURE — 97530 THERAPEUTIC ACTIVITIES: CPT

## 2022-12-14 PROCEDURE — 85018 HEMOGLOBIN: CPT

## 2022-12-14 PROCEDURE — 97162 PT EVAL MOD COMPLEX 30 MIN: CPT

## 2022-12-14 PROCEDURE — 97535 SELF CARE MNGMENT TRAINING: CPT

## 2022-12-14 PROCEDURE — 36415 COLL VENOUS BLD VENIPUNCTURE: CPT

## 2022-12-14 PROCEDURE — 74011250636 HC RX REV CODE- 250/636: Performed by: NURSE PRACTITIONER

## 2022-12-14 PROCEDURE — 97116 GAIT TRAINING THERAPY: CPT

## 2022-12-14 PROCEDURE — 80048 BASIC METABOLIC PNL TOTAL CA: CPT

## 2022-12-14 PROCEDURE — 77010033678 HC OXYGEN DAILY

## 2022-12-14 PROCEDURE — 74011250636 HC RX REV CODE- 250/636: Performed by: ORTHOPAEDIC SURGERY

## 2022-12-14 PROCEDURE — 74011000250 HC RX REV CODE- 250: Performed by: ORTHOPAEDIC SURGERY

## 2022-12-14 PROCEDURE — 97165 OT EVAL LOW COMPLEX 30 MIN: CPT

## 2022-12-14 PROCEDURE — 65270000029 HC RM PRIVATE

## 2022-12-14 PROCEDURE — 94761 N-INVAS EAR/PLS OXIMETRY MLT: CPT

## 2022-12-14 RX ADMIN — CHLORTHALIDONE 25 MG: 25 TABLET ORAL at 08:30

## 2022-12-14 RX ADMIN — SODIUM CHLORIDE 125 ML/HR: 9 INJECTION, SOLUTION INTRAVENOUS at 10:40

## 2022-12-14 RX ADMIN — LABETALOL HYDROCHLORIDE 200 MG: 100 TABLET, FILM COATED ORAL at 08:30

## 2022-12-14 RX ADMIN — SENNOSIDES AND DOCUSATE SODIUM 1 TABLET: 50; 8.6 TABLET ORAL at 08:30

## 2022-12-14 RX ADMIN — Medication 10 ML: at 05:48

## 2022-12-14 RX ADMIN — SODIUM CHLORIDE 125 ML/HR: 9 INJECTION, SOLUTION INTRAVENOUS at 02:51

## 2022-12-14 RX ADMIN — Medication 10 ML: at 14:33

## 2022-12-14 RX ADMIN — ONDANSETRON 4 MG: 2 INJECTION INTRAMUSCULAR; INTRAVENOUS at 10:28

## 2022-12-14 RX ADMIN — FAMOTIDINE 20 MG: 20 TABLET, FILM COATED ORAL at 08:30

## 2022-12-14 RX ADMIN — SENNOSIDES AND DOCUSATE SODIUM 1 TABLET: 50; 8.6 TABLET ORAL at 18:31

## 2022-12-14 RX ADMIN — AMLODIPINE BESYLATE 5 MG: 5 TABLET ORAL at 08:30

## 2022-12-14 RX ADMIN — ATORVASTATIN CALCIUM 20 MG: 20 TABLET, FILM COATED ORAL at 19:48

## 2022-12-14 RX ADMIN — CEFAZOLIN 2 G: 1 INJECTION, POWDER, FOR SOLUTION INTRAMUSCULAR; INTRAVENOUS at 05:48

## 2022-12-14 RX ADMIN — SODIUM CHLORIDE 125 ML/HR: 9 INJECTION, SOLUTION INTRAVENOUS at 18:34

## 2022-12-14 RX ADMIN — HYDRALAZINE HYDROCHLORIDE 25 MG: 25 TABLET, FILM COATED ORAL at 19:48

## 2022-12-14 RX ADMIN — OXYCODONE 10 MG: 5 TABLET ORAL at 01:43

## 2022-12-14 RX ADMIN — FAMOTIDINE 20 MG: 20 TABLET, FILM COATED ORAL at 18:31

## 2022-12-14 RX ADMIN — DILTIAZEM HYDROCHLORIDE 120 MG: 120 CAPSULE, COATED, EXTENDED RELEASE ORAL at 08:30

## 2022-12-14 RX ADMIN — OXYCODONE 10 MG: 5 TABLET ORAL at 08:30

## 2022-12-14 RX ADMIN — LABETALOL HYDROCHLORIDE 200 MG: 100 TABLET, FILM COATED ORAL at 19:48

## 2022-12-14 RX ADMIN — OXYCODONE 10 MG: 5 TABLET ORAL at 19:48

## 2022-12-14 RX ADMIN — OXYCODONE 10 MG: 5 TABLET ORAL at 23:51

## 2022-12-14 RX ADMIN — Medication 10 ML: at 19:51

## 2022-12-14 RX ADMIN — HYDRALAZINE HYDROCHLORIDE 25 MG: 25 TABLET, FILM COATED ORAL at 08:30

## 2022-12-14 RX ADMIN — LOSARTAN POTASSIUM 100 MG: 50 TABLET, FILM COATED ORAL at 08:30

## 2022-12-14 RX ADMIN — POLYETHYLENE GLYCOL 3350 17 G: 17 POWDER, FOR SOLUTION ORAL at 08:30

## 2022-12-14 NOTE — PROGRESS NOTES
Problem: Self Care Deficits Care Plan (Adult)  Goal: *Acute Goals and Plan of Care (Insert Text)  Description: FUNCTIONAL STATUS PRIOR TO ADMISSION: Patient was independent without use of DME. Patient was modified independent for basic ADLs, however reports that recently daughter's had to assist with LB ADLs and ADL transfers for safety due to periodic LE numbness. HOME SUPPORT: The patient lived with daughter who assisted as needed. Second daughter will be coming from out of town to assist as well. Occupational Therapy Goals  Initiated 12/14/2022    1. Patient will perform lower body dressing with modified independence using LH AE PRN within 7 days. 2.  Patient will perform toileting and toilet transfer with modified independence using most appropriate DME within 7 days. 3.  Patient will perform standing grooming at supervision/set-up within 7 days. 4.  Patient will don/doff back brace at supervision/set-up within 7 days. 5.  Patient will verbalize/demonstrate 3/3 back precautions during ADL tasks without cues within 7 days. Outcome: Progressing Towards Goal       OCCUPATIONAL THERAPY EVALUATION  Patient: Freddie Swanson (8 y.o. female)  Date: 12/14/2022  Primary Diagnosis: Pain, lumbar region [M54.50]  Spondylolisthesis of lumbar region [M43.16]  Spinal stenosis, lumbar region, with neurogenic claudication [M48.062]  Lumbar stenosis [M48.061]  Procedure(s) (LRB):  L4-L5 LAMINECTOMY /FUSION / INSTRUMENTATION/TRANSFORAMINAL LUMBAR INTERBODY FUSION (N/A) 1 Day Post-Op   Precautions:  Spinal, Fall    ASSESSMENT  Based on the objective data described below, the patient presents with decreased activity tolerance, increased back pain, generally decreased strength, impaired dynamic standing balance, impaired functional reach for LB ADLs, and decreased mobility and ADL performance on POD 1 s/p L4-5 laminectomy and fusion.  Pt further limited by nausea and dry heaving with activity (BP stable throughout) and intermittent drowsiness. Pt today received OOB in chair and is pleasant and motivated to participate with OT. She performs ADL transfer to [facility] with overall CGA and participates in [facility] AE training with reacher to manage underpants with up to mod A. Pt becoming nauseous with + dry heaving (no emesis) upon return to bed. Noted +PCA use and pt reporting only having fruit for breakfast. She improves once seated and is able to participate in additional ADLs prior to returning to supine at end of session. Pt and supportive daughter are receptive to all education regarding back precautions, safety with ADLs and mobility, LSO mgmt, and general safety in prep for safe discharge. Current Level of Function Impacting Discharge (ADLs/self-care): set up/supervision to mod A for ADLs; CGA/min A for ADLs transfers    Functional Outcome Measure: The patient scored 55/100 on the Barthel outcome measure which is indicative of moderate functional impairment. Other factors to consider for discharge: back precautions; pain control; supportive daughters     Patient will benefit from skilled therapy intervention to address the above noted impairments. PLAN :  Recommendations and Planned Interventions: self care training, functional mobility training, therapeutic exercise, balance training, therapeutic activities, endurance activities, patient education, home safety training, and family training/education    Frequency/Duration: Patient will be followed by occupational therapy 5 times a week to address goals. Recommendation for discharge: (in order for the patient to meet his/her long term goals)  Occupational therapy at least 2 days/week in the home  vs. none    This discharge recommendation:  Has not yet been discussed the attending provider and/or case management    IF patient discharges home will need the following DME: [facility] AE and RW       SUBJECTIVE:   Patient stated I feel really nauseous.     OBJECTIVE DATA SUMMARY:   HISTORY:   Past Medical History:   Diagnosis Date    Arthritis     Osteo? Lumbar    CAD (coronary artery disease)     Depression 01/21/2022    Major Depressive d/o,panic attacks, chlostrophobia, agoraphobia    Fibroid     Hypertension     Age 14/Resistant    MI (myocardial infarction) ([facility] [address] 04/27/2020    Prox RCA 2 stents    Psychiatric disorder     Vertigo     s/p Concussion 2/17     Past Surgical History:   Procedure Laterality Date    HX GYN      2004 endometrial ablation    HX PARTIAL HYSTERECTOMY  2012    still needs paps    HX TUBAL LIGATION      hysterectomy 2011       Expanded or extensive additional review of patient history:     Home Situation  Home Environment: Private residence  # Steps to Enter: 3  Rails to Enter: Yes  Hand Rails : Right  One/Two Story Residence: One story  Living Alone: No  Support Systems: Child(mark anthony)  Patient Expects to be Discharged to[de-identified] Home with home health  Current DME Used/Available at Home: Cane, quad, Raised toilet seat  Tub or Shower Type: Tub/Shower combination    Hand dominance: Right    EXAMINATION OF PERFORMANCE DEFICITS:  Cognitive/Behavioral Status:  Neurologic State: Alert  Orientation Level: Oriented X4  Cognition: Appropriate decision making; Appropriate for age attention/concentration; Appropriate safety awareness; Follows commands  Perception: Appears intact  Perseveration: No perseveration noted  Safety/Judgement: Awareness of environment; Fall prevention;Good awareness of safety precautions; Home safety; Insight into deficits    Skin: dressing intact on lumbar spine; hemovac intact. Hearing: Auditory  Auditory Impairment: None  Hearing Aids/Status: Does not own    Range of Motion:  AROM: Generally decreased, functional    Strength:  Strength: Generally decreased, functional    Coordination:  Coordination: Within functional limits  Fine Motor Skills-Upper: Left Intact; Right Intact    Gross Motor Skills-Upper: Left Intact; Right Intact    Tone & Sensation:  Tone: Normal  Sensation: Impaired (tingling in otis LE (R>L LE))    Balance:  Sitting: Intact  Standing: Impaired; With support  Standing - Static: Good  Standing - Dynamic : Fair    Functional Mobility and Transfers for ADLs:  Bed Mobility:  Rolling: Stand-by assistance; Adaptive equipment  Supine to Sit:  (pt received OOB in chair)  Sit to Supine: Minimum assistance  Scooting: Stand-by assistance    Transfers:  Sit to Stand: Contact guard assistance  Stand to Sit: Contact guard assistance  Bed to Chair: Contact guard assistance  Toilet Transfer : Contact guard assistance (for SPT to [facility])  Assistive Device : Gait Belt;Walker, rolling    ADL Assessment:  Feeding: Independent    Oral Facial Hygiene/Grooming: Setup;Supervision    Bathing: Minimum assistance    Type of Bath: Chlorhexidine (CHG)    Upper Body Dressing: Setup;Minimum assistance (including LSO mgmt)    Lower Body Dressing: Moderate assistance    Toileting: Contact guard assistance    ADL Intervention and task modifications:  Patient instructed and demonstrated 3/3 back precautions with minimal cues. Upper Body Dressing Assistance  Orthotics(Brace): Minimum assistance; Moderate assistance (to manage LSO at EOB)  Cues: Physical assistance; Tactile cues provided;Verbal cues provided;Visual cues provided    Lower Body Dressing Assistance  Underpants: Minimum assistance; Moderate assistance; Compensatory technique training (with reacher)  Leg Crossed Method Used: No  Position Performed: Seated in chair  Cues: Don;Doff;Physical assistance; Tactile cues provided;Verbal cues provided;Visual cues provided    Cognitive Retraining  Safety/Judgement: Awareness of environment; Fall prevention;Good awareness of safety precautions; Home safety; Insight into deficits    Patient instructed and indicated understanding the benefits of maintaining activity tolerance, functional mobility, and independence with self care tasks during acute stay  to ensure safe return home and to baseline. Encouraged patient to increase frequency and duration OOB, not sitting longer than 30 mins without marching/walking with staff, be out of bed for all meals, perform daily ADLs (as approved by RN/MD regarding bathing etc), and performing functional mobility to/from bathroom. Patient instruction and indicated understanding on body mechanics, ergonomics and gravitational force on the spine during different body positions to plan activities in prep for return home to complete basic ADLs, instrumental ADLs and back to work safely. Dressing brace: Patient instructed and demonstrated to don/doff Velcro on brace using dominant side, keeping non-dominant side intact. Patient instructed and demonstrated in meantime of being able to stand with back against wall to don/doff brace, to don/doff seated using lap and bed/chair surface to support brace while manipulating. Dressing lower body: Patient instructed to don brace first and on the benefits to remain seated to don all clothing to increase independence with precautions and pain management. Patient instructed on tailor sitting but unable to perform and participates in [facility] AE training with reacher with overall mod A  Home safety: Patient instructed and indicated understanding on home modifications and safety [raise height of ADL objects (i.e. clothing, sink items, fridge items, items to mouth when grooming), appropriate height of chair surfaces, recliner safety, change of floor surfaces, clear pathways] to increase independence and fall prevention. Functional Measure:    Barthel Index:  Bathin  Bladder: 10  Bowels: 10  Groomin  Dressin  Feeding: 10  Mobility: 0  Stairs: 0  Toilet Use: 5  Transfer (Bed to Chair and Back): 10  Total: 55/100      The Barthel ADL Index: Guidelines  1. The index should be used as a record of what a patient does, not as a record of what a patient could do.   2. The main aim is to establish degree of independence from any help, physical or verbal, however minor and for whatever reason. 3. The need for supervision renders the patient not independent. 4. A patient's performance should be established using the best available evidence. Asking the patient, friends/relatives and nurses are the usual sources, but direct observation and common sense are also important. However direct testing is not needed. 5. Usually the patient's performance over the preceding 24-48 hours is important, but occasionally longer periods will be relevant. 6. Middle categories imply that the patient supplies over 50 per cent of the effort. 7. Use of aids to be independent is allowed. Score Interpretation (from [address] [facility] [address])    Independent   60-79 Minimally independent   40-59 Partially dependent   20-39 Very dependent   <20 Totally dependent     -Sky Gary., Barthel, D.W. (1965). Functional evaluation: the Barthel Index. [address] [facility] ([address] (1997). The Barthel activities of daily living index: self-reporting versus actual performance in the old (> or = 75 years). Journal [address] 45(7), [address] [facility], J.JBRIDGETTF, Robinson Brown., Adrianne Howe. (1999). Measuring the change in disability after inpatient rehabilitation; comparison of the responsiveness of the Barthel Index and Functional [place] Measure. Journal of Neurology, Neurosurgery, and Psychiatry, 66(4), 810-730. GEORGINA Nolan, VICTORINO Campa, & Jonatan Mccauley, M.A. (2004) Assessment of post-stroke quality of life in cost-effectiveness studies: The usefulness of the Barthel Index and the EuroQoL-5D.  Quality of Life Research, 15, 088-32     Occupational Therapy Evaluation Charge Determination   History Examination Decision-Making   LOW Complexity : Brief history review  MEDIUM Complexity : 3-5 performance deficits relating to physical, cognitive , or psychosocial skils that result in activity limitations and / or participation restrictions LOW Complexity : No comorbidities that affect functional and no verbal or physical assistance needed to complete eval tasks       Based on the above components, the patient evaluation is determined to be of the following complexity level: LOW   Pain Rating:  Pt reporting moderate pain    Activity Tolerance:   Fair, SpO2 stable on RA, requires rest breaks, and most limited by nausea and dry heaving at end of session    After treatment patient left in no apparent distress:    Supine in bed, Call bell within reach, Bed / chair alarm activated, Caregiver / family present, and Side rails x 3    COMMUNICATION/EDUCATION:   The patients plan of care was discussed with: Physical therapist and Registered nurse. Home safety education was provided and the patient/caregiver indicated understanding., Patient/family have participated as able in goal setting and plan of care. , and Patient/family agree to work toward stated goals and plan of care. This patients plan of care is appropriate for delegation to [facility].     Thank you for this referral.  Oscar Roe OT  Time Calculation: 46 mins

## 2022-12-14 NOTE — ANESTHESIA POSTPROCEDURE EVALUATION
Procedure(s):  L4-L5 LAMINECTOMY /FUSION / INSTRUMENTATION/TRANSFORAMINAL LUMBAR INTERBODY FUSION. general    Anesthesia Post Evaluation      Multimodal analgesia: multimodal analgesia not used between 6 hours prior to anesthesia start to PACU discharge  Patient location during evaluation: PACU  Patient participation: complete - patient participated  Level of consciousness: awake  Pain management: adequate  Airway patency: patent  Anesthetic complications: no  Cardiovascular status: acceptable, blood pressure returned to baseline and hemodynamically stable  Respiratory status: acceptable  Hydration status: acceptable  Post anesthesia nausea and vomiting:  controlled      INITIAL Post-op Vital signs:   Vitals Value Taken Time   /82 12/13/22 1940   Temp 37.1 °C (98.7 °F) 12/13/22 1756   Pulse 87 12/13/22 1942   Resp 7 12/13/22 1942   SpO2 95 % 12/13/22 1942   Vitals shown include unvalidated device data.

## 2022-12-14 NOTE — PERIOP NOTES
TRANSFER - OUT REPORT:    Verbal report given to Emily Antoine RN (name) on Arlene Hull  being transferred to 4th Floor - Room 433 (unit) for routine post - op       Report consisted of patients Situation, Background, Assessment and   Recommendations(SBAR). Information from the following report(s) SBAR, Kardex, OR Summary, Procedure Summary, Intake/Output, MAR, Accordion, Recent Results, Med Rec Status, and Cardiac Rhythm NSR  was reviewed with the receiving nurse. Lines:   Peripheral IV 12/13/22 Posterior;Right Hand (Active)   Site Assessment Clean, dry, & intact 12/13/22 1756   Phlebitis Assessment 0 12/13/22 1756   Infiltration Assessment 0 12/13/22 1756   Dressing Status Clean, dry, & intact 12/13/22 1756   Dressing Type Transparent;Tape 12/13/22 1756   Hub Color/Line Status Pink; Infusing;Patent 12/13/22 1756   Action Taken Open ports on tubing capped 12/13/22 1756   Alcohol Cap Used Yes 12/13/22 1756        Opportunity for questions and clarification was provided.       Patient transported with:   O2 @ 4 liters  Registered Nurse

## 2022-12-14 NOTE — PROGRESS NOTES
Problem: Mobility Impaired (Adult and Pediatric)  Goal: *Acute Goals and Plan of Care (Insert Text)  Description: FUNCTIONAL STATUS PRIOR TO ADMISSION: Patient was independent and active without use of DME.    HOME SUPPORT PRIOR TO ADMISSION: The patient lived with her daughter but did not require assist. Her second daughter will be providing additional assistance upon d /c     Physical Therapy Goals  Initiated 12/14/2022    1. Patient will move from supine to sit and sit to supine , scoot up and down, and roll side to side in bed with modified independence within 4 days. 2. Patient will perform sit to stand with modified independence within 4 days. 3. Patient will ambulate with modified independence for 150 feet with the least restrictive device within 4 days. 4. Patient will ascend/descend 3 stairs with 1-2 handrail(s) with minimal assistance/contact guard assist within 4 days. 5. Patient will verbalize and demonstrate understanding of spinal precautions (No bending, lifting greater than 5 lbs, or twisting; log-roll technique; frequent repositioning as instructed) within 4 days. Outcome: Not Met   PHYSICAL THERAPY EVALUATION  Patient: Mariaelena Johnson (2 y.o. female)  Date: 12/14/2022  Primary Diagnosis: Pain, lumbar region [M54.50]  Spondylolisthesis of lumbar region [M43.16]  Spinal stenosis, lumbar region, with neurogenic claudication [M48.062]  Lumbar stenosis [M48.061]  Procedure(s) (LRB):  L4-L5 LAMINECTOMY /FUSION / INSTRUMENTATION/TRANSFORAMINAL LUMBAR INTERBODY FUSION (N/A) 1 Day Post-Op   Precautions:   Spinal, Fall      ASSESSMENT  Based on the objective data described below, the patient presents with drowsiness, RLE tingling/numbness, good balance with RW support, decreased strength, activity tolerance and overall reduced functional mobility from her independent baseline in the setting of hospital admission for POD1 L4-L5 lami/fusion.  Patient today drowsy but maintains alertness for entirety of PT evaluation. She has great participation and tolerates 5 ft ambulation to bedside chair with CGA and RW. Reviewed back precautions and provided & fit LSO brace as below. Vitals stable, patient without complaints of lightheadedness/dizziness, shortness of breathe or palpitations. Recommend HHPT upon d/c. Current Level of Function Impacting Discharge (mobility/balance): CGA with rW    Functional Outcome Measure: The patient scored Total Score: 16/28 on the Tinetti outcome measure which is indicative of high fall risk. Other factors to consider for discharge: patient will have her 2 daughters providing assistance upon d/c. Patient will benefit from skilled therapy intervention to address the above noted impairments. PLAN :  Recommendations and Planned Interventions: bed mobility training, transfer training, gait training, therapeutic exercises, orthotic/prosthetic training, modalities, edema management/control, patient and family training/education, and therapeutic activities      Frequency/Duration: Patient will be followed by physical therapy:  twice daily to address goals. Recommendation for discharge: (in order for the patient to meet his/her long term goals)  Physical therapy at least 2 days/week in the home     This discharge recommendation:  Has been made in collaboration with the attending provider and/or case management    IF patient discharges home will need the following DME: rolling walker         SUBJECTIVE:   Patient stated I need a prescription for a RW.    OBJECTIVE DATA SUMMARY:   Patient received supine in bed and was agreeable to participate in PT session. Patient was cleared by nursing to participate in PT session. Her daughter was present in room for entirety of evaluation.      Vitals:    12/14/22 0912 12/14/22 0916 12/14/22 0926   BP: 121/73 (!) 143/85 130/76   BP 1 Location: Left upper arm Left upper arm Left upper arm   BP Patient Position: Supine Sitting Sitting  Comment: post transfer   Pulse: 76 95 85   Temp:      Resp:      Height:      Weight:      SpO2:  96% 92%         HISTORY:    Past Medical History:   Diagnosis Date    Arthritis     Osteo? Lumbar    CAD (coronary artery disease)     Depression 01/21/2022    Major Depressive d/o,panic attacks, chlostrophobia, agoraphobia    Fibroid     Hypertension     Age 14/Resistant    MI (myocardial infarction) ([facility] [address] 04/27/2020    Prox RCA 2 stents    Psychiatric disorder     Vertigo     s/p Concussion 2/17     Past Surgical History:   Procedure Laterality Date    HX GYN      2004 endometrial ablation    HX PARTIAL HYSTERECTOMY  2012    still needs paps    HX TUBAL LIGATION      hysterectomy 2011       Personal factors and/or comorbidities impacting plan of care: none additional    Home Situation  Home Environment: Private residence  # Steps to Enter: 3  Rails to Enter: Yes  Hand Rails : Right  One/Two Story Residence: One story  Living Alone: No  Support Systems: Child(mark anthony) (2 daughters)  Patient Expects to be Discharged to[de-identified] Home  Current DME Used/Available at Home: Cane, quad, Raised toilet seat  Tub or Shower Type: Tub/Shower combination    EXAMINATION/PRESENTATION/DECISION MAKING:   Critical Behavior:  Neurologic State: Alert  Orientation Level: Oriented X4  Cognition: Appropriate decision making, Follows commands     Hearing: Auditory  Auditory Impairment: None  Hearing Aids/Status: Does not own  Skin:  surgical dressing intact and clean; + hemovac;  +PCA  Edema: none apparent   Range Of Motion:  AROM: Generally decreased, functional                       Strength:    Strength: Generally decreased, functional                    Tone & Sensation:   Tone: Normal              Sensation: Impaired (tingling in otis LE (R>L LE))               Coordination:  Coordination: Within functional limits  Vision:      Functional Mobility:  Bed Mobility:  Rolling: Stand-by assistance; Adaptive equipment  Supine to Sit: Contact guard assistance; Adaptive equipment; Additional time     Scooting: Stand-by assistance  Transfers:  Sit to Stand: Contact guard assistance  Stand to Sit: Contact guard assistance        Bed to Chair: Contact guard assistance              Balance:   Sitting: Intact; With support  Standing: Impaired; With support  Standing - Static: Good  Standing - Dynamic : Fair  Ambulation/Gait Training:  Distance (ft): 5 Feet (ft)  Assistive Device: Gait belt;Brace/Splint; Walker, rolling  Ambulation - Level of Assistance: Contact guard assistance        Gait Abnormalities: Decreased step clearance; Step to gait;Trunk sway increased              Speed/Grisel: Pace decreased (<100 feet/min); Shuffled                        Stairs: Therapeutic Exercises:     Patient Education  Back precautions (no bending, lifting > 5 lbs, twisting)  Application, donning & use of LSO brace  Sitting for 30 minutes at a time      Functional Measure:  Tinetti test:    Sitting Balance: 1  Arises: 1  Attempts to Rise: 2  Immediate Standing Balance: 1  Standing Balance: 1  Nudged: 1  Eyes Closed: 1  Turn 360 Degrees - Continuous/Discontinuous: 0  Turn 360 Degrees - Steady/Unsteady: 1  Sitting Down: 2  Balance Score: 11 Balance total score  Indication of Gait: 1  R Step Length/Height: 1  L Step Length/Height: 0  R Foot Clearance: 1  L Foot Clearance: 1  Step Symmetry: 0  Step Continuity: 0  Path: 1  Trunk: 0  Walking Time: 0  Gait Score: 5 Gait total score  Total Score: 16/28 Overall total score         Tinetti Tool Score Risk of Falls  <19 = High Fall Risk  19-24 = Moderate Fall Risk  25-28 = Low Fall Risk  Tinetti ME. Performance-Oriented Assessment of Mobility Problems in Elderly Patients. Guzman 66; [id number].  (Scoring Description: PT Bulletin Feb. 10, 1993)    Older adults: Mora Mckee et al, 2009; n = 1000 [facility] elderly evaluated with ABC, CALEB, ADL, and IADL)  · Mean CALEB score for males aged 69-68 years = 26.21(3.40)  · Mean CALEB score for females age 69-68 years = 25.16(4.30)  · Mean CALEB score for males over [de-identified] years = 23.29(6.02)  · Mean CALEB score for females over [de-identified] years = 17.20(8.32)        Physical Therapy Evaluation Charge Determination   History Examination Presentation Decision-Making   LOW Complexity : Zero comorbidities / personal factors that will impact the outcome / POC LOW Complexity : 1-2 Standardized tests and measures addressing body structure, function, activity limitation and / or participation in recreation  LOW Complexity : Stable, uncomplicated  LOW Complexity : FOTO score of       Based on the above components, the patient evaluation is determined to be of the following complexity level: LOW     Pain Rating:  Patient with minimal complaints of pain during therapy      Activity Tolerance:   Good, tolerates ADLs without rest breaks, and SpO2 stable on RA      After treatment patient left in no apparent distress:   Sitting in chair, Call bell within reach, Bed / chair alarm activated, and Caregiver / family present    COMMUNICATION/EDUCATION:   The patients plan of care was discussed with: Occupational therapist, Registered nurse, Case management, and ortho PA . Fall prevention education was provided and the patient/caregiver indicated understanding. and Patient/family have participated as able in goal setting and plan of care.     Thank you for this referral.  Robinson Alba PT, DPT   Time Calculation: 32 mins PHYSICAL EXAM:    Vital Signs Last 24 Hrs  T(C): 36.3 (26 Feb 2022 23:57), Max: 36.3 (26 Feb 2022 18:31)  T(F): 97.3 (26 Feb 2022 23:57), Max: 97.3 (26 Feb 2022 18:31)  HR: 69 (26 Feb 2022 23:57) (69 - 95)  BP: 122/78 (26 Feb 2022 23:57) (122/73 - 122/78)  BP(mean): --  RR: 18 (26 Feb 2022 23:57) (16 - 18)  SpO2: 96% (26 Feb 2022 23:57) (94% - 96%)    GENERAL: Pt lying in bed comfortably in NAD, very drowsy  HEENT:  Atraumatic, pupils pin-point sluggish but reactive, conjunctiva and sclera clear, dry MM  NECK: Supple  CHEST/LUNG: Clear to auscultation bilaterally  HEART: Regular rate and rhythm;  ABDOMEN: Bowel sounds present; Soft, Nontender, Nondistended.    EXTREMITIES:  2+ Peripheral Pulses, RLE 2++ edema, patches to bruises/ecchymosis, warm  NEUROLOGICAL: Very drowsy, rousable but only moans and falls back to sleep  MSK: RLE as above  SKIN: RLE as above

## 2022-12-14 NOTE — PROGRESS NOTES
Problem: Mobility Impaired (Adult and Pediatric)  Goal: *Acute Goals and Plan of Care (Insert Text)  Description: FUNCTIONAL STATUS PRIOR TO ADMISSION: Patient was independent and active without use of DME.    HOME SUPPORT PRIOR TO ADMISSION: The patient lived with her daughter but did not require assist. Her second daughter will be providing additional assistance upon d /c     Physical Therapy Goals  Initiated 12/14/2022    1. Patient will move from supine to sit and sit to supine , scoot up and down, and roll side to side in bed with modified independence within 4 days. 2. Patient will perform sit to stand with modified independence within 4 days. 3. Patient will ambulate with modified independence for 150 feet with the least restrictive device within 4 days. 4. Patient will ascend/descend 3 stairs with 1-2 handrail(s) with minimal assistance/contact guard assist within 4 days. 5. Patient will verbalize and demonstrate understanding of spinal precautions (No bending, lifting greater than 5 lbs, or twisting; log-roll technique; frequent repositioning as instructed) within 4 days.       12/14/2022 1409 by Karen Hawkins, PT  Outcome: Progressing Towards Goal  12/14/2022 0938 by Karen Hawkins, PT  Outcome: Not Met   PHYSICAL THERAPY TREATMENT  Patient: Bryant Castillo (47 y.o. female)  Date: 12/14/2022  Diagnosis: Pain, lumbar region [M54.50]  Spondylolisthesis of lumbar region [M43.16]  Spinal stenosis, lumbar region, with neurogenic claudication [M48.062]  Lumbar stenosis [M48.061] <principal problem not specified>  Procedure(s) (LRB):  L4-L5 LAMINECTOMY /FUSION / INSTRUMENTATION/TRANSFORAMINAL LUMBAR INTERBODY FUSION (N/A) 1 Day Post-Op  Precautions: Spinal, Fall No bending, no lifting greater than 5 lbs, no twisting, log-roll technique, repositioning every 20-30 min except when sleeping, brace when OOB (if ordered)  Chart, physical therapy assessment, plan of care and goals were reviewed. ASSESSMENT  Patient continues with skilled PT services and is progressing towards goals. Patient this afternoon with great participation and tolerance to physical therapy session. She tolerates increased ambulation distance, 15ft to and from the restroom with CGA and RW. CGA as well for toilet transfer. Patient denies lightheadedness/dizziness throughout though blood pressure does drop to 116/65 after a few minutes of seated rest after ambulation. Blood pressure recovered to 136/61 within 1 minute with legs elevated in chair. Reported to RN. Her spo2 is stable >90% during ambulation per tele box. Continue to recommend HHPT upon d/c    Vitals:    12/14/22 1339 12/14/22 1343 12/14/22 1402 12/14/22 1406   BP: 127/70 (!) 152/79 (!) 142/62 136/61   BP 2:   116/65    BP 1 Location: Right upper arm      BP Patient Position: Supine Sitting Sitting Sitting   Pulse: 91 95 88 75   Pulse 2:   80    Temp:       Resp:       Height:       Weight:       SpO2:  92% 93%           Current Level of Function Impacting Discharge (mobility/balance): CGA with RW    Other factors to consider for discharge: none additional         PLAN :  Patient continues to benefit from skilled intervention to address the above impairments. Continue treatment per established plan of care. to address goals. Recommendation for discharge: (in order for the patient to meet his/her long term goals)  Physical therapy at least 2 days/week in the home     This discharge recommendation:  Has been made in collaboration with the attending provider and/or case management    IF patient discharges home will need the following DME: rolling walker       SUBJECTIVE:   Patient stated I'm okay.     OBJECTIVE DATA SUMMARY:   Patient received supine in bed and was agreeable to participate in PT session. Patient was cleared by nursing to participate in PT session.    Patient's daughter present in room during session    Critical Behavior:  Neurologic State: Alert  Orientation Level: Oriented X4  Cognition: Appropriate decision making, Appropriate for age attention/concentration, Appropriate safety awareness, Follows commands  Safety/Judgement: Awareness of environment, Fall prevention, Good awareness of safety precautions, Home safety, Insight into deficits    Spinal diagnosis intervention:  The patient stated 3/3 back precautions when prompted. Reviewed all 3 back precautions, log roll technique, and sitting for 30 minutes at a time. The patient required minimal to no cues to maintain back precautions during functional activity. Reviewed back brace application and wear schedule. Brace donned with supervision/set-up      Functional Mobility Training:    Bed Mobility:  Log Rolling: Stand-by assistance; Adaptive equipment  Supine to Sit:  (pt received OOB in chair)  Sit to Supine: Minimum assistance  Scooting: Stand-by assistance        Transfers:  Sit to Stand: Contact guard assistance  Stand to Sit: Contact guard assistance        Bed to Chair: Contact guard assistance                    Balance:  Sitting: Intact  Standing: Impaired; With support  Standing - Static: Good  Standing - Dynamic : Fair  Ambulation/Gait Training:  Distance (ft): 15 Feet (ft) (+ 20)  Assistive Device: Gait belt;Brace/Splint; Walker, rolling  Ambulation - Level of Assistance: Contact guard assistance        Gait Abnormalities: Decreased step clearance;Trunk sway increased              Speed/Grisel: Pace decreased (<100 feet/min); Shuffled                   Therapeutic Exercises:     Pain Rating:  Patient with minimal complaints of pain during therapy      Activity Tolerance:   Good and tolerates ADLs without rest breaks    After treatment patient left in no apparent distress:   Sitting in chair, Heels elevated for pressure relief, Call bell within reach, Bed / chair alarm activated, and Caregiver / family present    COMMUNICATION/COLLABORATION:   The patients plan of care was discussed with: Occupational therapist, Registered nurse, and Case management.      Ok Be PT, DPT   Time Calculation: 33 mins

## 2022-12-14 NOTE — PROGRESS NOTES
Problem: Falls - Risk of  Goal: *Absence of Falls  Description: Document Cherylle Cockayne Fall Risk and appropriate interventions in the flowsheet.   Outcome: Progressing Towards Goal  Note: Fall Risk Interventions:  Mobility Interventions: Bed/chair exit alarm, Patient to call before getting OOB         Medication Interventions: Bed/chair exit alarm, Patient to call before getting OOB, Teach patient to arise slowly    Elimination Interventions: Call light in reach, Bed/chair exit alarm              Problem: Patient Education: Go to Patient Education Activity  Goal: Patient/Family Education  Outcome: Progressing Towards Goal     Problem: Pain  Goal: *Control of Pain  Outcome: Progressing Towards Goal     Problem: Patient Education: Go to Patient Education Activity  Goal: Patient/Family Education  Outcome: Progressing Towards Goal     Problem: Patient Education: Go to Patient Education Activity  Goal: Patient/Family Education  Outcome: Progressing Towards Goal     Problem: Complex Spine Procedure:  Day of Surgery  Goal: Off Pathway (Use only if patient is Off Pathway)  Outcome: Progressing Towards Goal  Goal: Activity/Safety  Outcome: Progressing Towards Goal  Goal: Consults, if ordered  Outcome: Progressing Towards Goal  Goal: Diagnostic Test/Procedures  Outcome: Progressing Towards Goal  Goal: Nutrition/Diet  Outcome: Progressing Towards Goal  Goal: Discharge Planning  Outcome: Progressing Towards Goal  Goal: Medications  Outcome: Progressing Towards Goal  Goal: Respiratory  Outcome: Progressing Towards Goal  Goal: Treatments/Interventions/Procedures  Outcome: Progressing Towards Goal  Goal: Psychosocial  Outcome: Progressing Towards Goal  Goal: *Optimal pain control at patient's stated goal  Outcome: Progressing Towards Goal  Goal: *Demonstrates progressive activity  Outcome: Progressing Towards Goal  Goal: *Respiratory status stable  Outcome: Progressing Towards Goal     Problem: Complex Spine Procedure:  Post Op Day 1  Goal: Off Pathway (Use only if patient is Off Pathway)  Outcome: Progressing Towards Goal  Goal: Activity/Safety  Outcome: Progressing Towards Goal  Goal: Consults, if ordered  Outcome: Progressing Towards Goal  Goal: Diagnostic Test/Procedures  Outcome: Progressing Towards Goal  Goal: Nutrition/Diet  Outcome: Progressing Towards Goal  Goal: Discharge Planning  Outcome: Progressing Towards Goal  Goal: Medications  Outcome: Progressing Towards Goal  Goal: Respiratory  Outcome: Progressing Towards Goal  Goal: Treatments/Interventions/Procedures  Outcome: Progressing Towards Goal  Goal: Psychosocial  Outcome: Progressing Towards Goal  Goal: *Progress independence mobility/activities (eg: Mobility precautions)  Outcome: Progressing Towards Goal  Goal: *Verbalizes/demonstrates understanding of proper body mechanics and use of stabilization device if ordered  Outcome: Progressing Towards Goal  Goal: *Optimal pain control at patient's stated goal  Outcome: Progressing Towards Goal  Goal: *Resumes normal function of bladder and bowel  Outcome: Progressing Towards Goal  Goal: *Hemodynamically stable  Outcome: Progressing Towards Goal  Goal: *Tolerating diet  Outcome: Progressing Towards Goal  Goal: *Labs within defined limits  Outcome: Progressing Towards Goal     Problem: Hypertension  Goal: *Blood pressure within specified parameters  Outcome: Progressing Towards Goal  Goal: *Fluid volume balance  Outcome: Progressing Towards Goal  Goal: *Labs within defined limits  Outcome: Progressing Towards Goal     Problem: Patient Education: Go to Patient Education Activity  Goal: Patient/Family Education  Outcome: Progressing Towards Goal

## 2022-12-14 NOTE — PROGRESS NOTES
12/14/2022  10:23 AM  CM completed assessment w/ pt in person, charted demographics verified,   pt's physical address is:  [address] [place] [address], [facility], [address]  Pt lives w/ her Daughter Jin and grandson, her Daughter Karlene Young will assist at DC too. Pt lives in a 1 story home w/ 3 [address], she relates a fall a few months ago but denies frequent falls. At baseline pt  Is ambulatory w/ cane, independent w/ her ADLs and drives     Reason for Admission:  Elective  for L4-L5 Laminectomy                       RUR Score:     6 % Low Risk of Readmission/Green                Plan for utilizing home health:     PT, RAVI armstrong completed, the recommendation Is for [facility], pt has not had HH or Rehab  DME: Cane  Rx: [facility] Medicaid, pt uses [facility] in [place] and is covered for her medications  COVID Vax Hx; Unvaccinated      PCP: First and Last name:  Dinora James MD     Name of Practice:    Are you a current patient: Yes/No: yes    Approximate date of last visit: 1 week    Can you participate in a virtual visit with your PCP: yes                     Current Advanced Directive/Advance Care Plan: No Order  [facility] [phone]  Healthcare Decision Maker:   Click here to complete [address] including selection of the Healthcare Decision Maker Relationship (ie \"Primary\")                             Transition of Care Plan:     RUR 6 % Low Risk of Readmission/Green  LOS 1 Day     Hospital admission for surgical management   PT, RAVI armstrong, recommending HH at DC, pt agrees, FOC offered, agreeable to referrals to multiple agencies in insurance network   CM to follow through for treatment/response  DC when stable to home w/ family assistance and [facility]  Outpatient follow up Ortho  Family will transport at [facility] 6 Management Interventions  PCP Verified by CM:  Yes Mary Santiago MD )  Palliative Care Criteria Met (RRAT>21 & CHF Dx)?: No  Mode of Transport at Discharge: Self (Family)  Transition of Care Consult (CM Consult): Home Health, DME/Supply Assistance  Discharge Durable Medical Equipment: Yes (RW)  Physical Therapy Consult: Yes  Occupational Therapy Consult: Yes  Support Systems: Child(mark anthony)  Confirm Follow Up Transport: Family  The Plan for Transition of Care is Related to the Following Treatment Goals : [address]  The Patient and/or Patient Representative was Provided with a Choice of Provider and Agrees with the Discharge Plan?: Yes  Name of the Patient Representative Who was Provided with a Choice of Provider and Agrees with the Discharge Plan: Sherman Allen  Freedom of Choice List was Provided with Basic Dialogue that Supports the Patient's Individualized Plan of Care/Goals, Treatment Preferences and Shares the Quality Data Associated with the Providers?: (S) Yes  Discharge Location  Patient Expects to be Discharged to[de-identified] Home with home health  Arthur Alfaro [address]

## 2022-12-14 NOTE — PROGRESS NOTES
12/14/2022 3:33 PM CM continues to search for home health company that services pt's home address and accepts pt's insurance. Referrals have been sent to: [facility]- denied  [facility] - denied  [facility] -denied  [facility]- denied  [facility]- denied  [facility]- denied  [facility]- accepted     CM will follow up.

## 2022-12-15 ENCOUNTER — APPOINTMENT (OUTPATIENT)
Dept: ULTRASOUND IMAGING | Age: 52
DRG: 304 | End: 2022-12-15
Attending: INTERNAL MEDICINE
Payer: MEDICAID

## 2022-12-15 LAB
ANION GAP SERPL CALC-SCNC: 9 MMOL/L (ref 5–15)
BUN SERPL-MCNC: 28 MG/DL (ref 6–20)
BUN/CREAT SERPL: 15 (ref 12–20)
CALCIUM SERPL-MCNC: 8 MG/DL (ref 8.5–10.1)
CHLORIDE SERPL-SCNC: 103 MMOL/L (ref 97–108)
CO2 SERPL-SCNC: 25 MMOL/L (ref 21–32)
CREAT SERPL-MCNC: 1.82 MG/DL (ref 0.55–1.02)
CREAT UR-MCNC: 76 MG/DL
GLUCOSE SERPL-MCNC: 139 MG/DL (ref 65–100)
HGB BLD-MCNC: 9.5 G/DL (ref 11.5–16)
POTASSIUM SERPL-SCNC: 3.5 MMOL/L (ref 3.5–5.1)
SODIUM SERPL-SCNC: 137 MMOL/L (ref 136–145)
SODIUM UR-SCNC: 44 MMOL/L
UR CULT HOLD, URHOLD: NORMAL
UUN UR-MCNC: 464 MG/DL

## 2022-12-15 PROCEDURE — 74011250636 HC RX REV CODE- 250/636: Performed by: NURSE PRACTITIONER

## 2022-12-15 PROCEDURE — 97535 SELF CARE MNGMENT TRAINING: CPT

## 2022-12-15 PROCEDURE — 36415 COLL VENOUS BLD VENIPUNCTURE: CPT

## 2022-12-15 PROCEDURE — 97530 THERAPEUTIC ACTIVITIES: CPT

## 2022-12-15 PROCEDURE — 76775 US EXAM ABDO BACK WALL LIM: CPT

## 2022-12-15 PROCEDURE — 74011250636 HC RX REV CODE- 250/636: Performed by: INTERNAL MEDICINE

## 2022-12-15 PROCEDURE — 74011250637 HC RX REV CODE- 250/637: Performed by: ORTHOPAEDIC SURGERY

## 2022-12-15 PROCEDURE — 84300 ASSAY OF URINE SODIUM: CPT

## 2022-12-15 PROCEDURE — 97116 GAIT TRAINING THERAPY: CPT

## 2022-12-15 PROCEDURE — 74011000250 HC RX REV CODE- 250: Performed by: ORTHOPAEDIC SURGERY

## 2022-12-15 PROCEDURE — 85018 HEMOGLOBIN: CPT

## 2022-12-15 PROCEDURE — 84540 ASSAY OF URINE/UREA-N: CPT

## 2022-12-15 PROCEDURE — 80048 BASIC METABOLIC PNL TOTAL CA: CPT

## 2022-12-15 PROCEDURE — 82570 ASSAY OF URINE CREATININE: CPT

## 2022-12-15 PROCEDURE — 65270000029 HC RM PRIVATE

## 2022-12-15 PROCEDURE — 74011250637 HC RX REV CODE- 250/637: Performed by: NURSE PRACTITIONER

## 2022-12-15 RX ORDER — DEXAMETHASONE SODIUM PHOSPHATE 4 MG/ML
10 INJECTION, SOLUTION INTRA-ARTICULAR; INTRALESIONAL; INTRAMUSCULAR; INTRAVENOUS; SOFT TISSUE ONCE
Status: COMPLETED | OUTPATIENT
Start: 2022-12-15 | End: 2022-12-15

## 2022-12-15 RX ORDER — SODIUM CHLORIDE, SODIUM LACTATE, POTASSIUM CHLORIDE, CALCIUM CHLORIDE 600; 310; 30; 20 MG/100ML; MG/100ML; MG/100ML; MG/100ML
75 INJECTION, SOLUTION INTRAVENOUS CONTINUOUS
Status: DISCONTINUED | OUTPATIENT
Start: 2022-12-15 | End: 2022-12-17 | Stop reason: HOSPADM

## 2022-12-15 RX ORDER — HYDROMORPHONE HYDROCHLORIDE 2 MG/1
4 TABLET ORAL
Status: DISCONTINUED | OUTPATIENT
Start: 2022-12-15 | End: 2022-12-17 | Stop reason: HOSPADM

## 2022-12-15 RX ORDER — HYDROMORPHONE HYDROCHLORIDE 2 MG/1
2 TABLET ORAL
Status: DISCONTINUED | OUTPATIENT
Start: 2022-12-15 | End: 2022-12-17 | Stop reason: HOSPADM

## 2022-12-15 RX ADMIN — LABETALOL HYDROCHLORIDE 200 MG: 100 TABLET, FILM COATED ORAL at 08:58

## 2022-12-15 RX ADMIN — HYDROMORPHONE HYDROCHLORIDE 4 MG: 2 TABLET ORAL at 22:03

## 2022-12-15 RX ADMIN — Medication 10 ML: at 22:00

## 2022-12-15 RX ADMIN — DEXAMETHASONE SODIUM PHOSPHATE 10 MG: 4 INJECTION, SOLUTION INTRAMUSCULAR; INTRAVENOUS at 11:08

## 2022-12-15 RX ADMIN — ATORVASTATIN CALCIUM 20 MG: 20 TABLET, FILM COATED ORAL at 22:04

## 2022-12-15 RX ADMIN — OXYCODONE 10 MG: 5 TABLET ORAL at 02:53

## 2022-12-15 RX ADMIN — SENNOSIDES AND DOCUSATE SODIUM 1 TABLET: 50; 8.6 TABLET ORAL at 17:27

## 2022-12-15 RX ADMIN — FAMOTIDINE 20 MG: 20 TABLET, FILM COATED ORAL at 17:27

## 2022-12-15 RX ADMIN — SODIUM CHLORIDE, POTASSIUM CHLORIDE, SODIUM LACTATE AND CALCIUM CHLORIDE 75 ML/HR: 600; 310; 30; 20 INJECTION, SOLUTION INTRAVENOUS at 11:01

## 2022-12-15 RX ADMIN — CYCLOBENZAPRINE 10 MG: 10 TABLET, FILM COATED ORAL at 08:55

## 2022-12-15 RX ADMIN — LOSARTAN POTASSIUM 100 MG: 50 TABLET, FILM COATED ORAL at 08:58

## 2022-12-15 RX ADMIN — LABETALOL HYDROCHLORIDE 200 MG: 100 TABLET, FILM COATED ORAL at 22:04

## 2022-12-15 RX ADMIN — FAMOTIDINE 20 MG: 20 TABLET, FILM COATED ORAL at 08:56

## 2022-12-15 RX ADMIN — SENNOSIDES AND DOCUSATE SODIUM 1 TABLET: 50; 8.6 TABLET ORAL at 08:56

## 2022-12-15 RX ADMIN — HYDRALAZINE HYDROCHLORIDE 25 MG: 25 TABLET, FILM COATED ORAL at 22:04

## 2022-12-15 RX ADMIN — CHLORTHALIDONE 25 MG: 25 TABLET ORAL at 08:56

## 2022-12-15 RX ADMIN — POLYETHYLENE GLYCOL 3350 17 G: 17 POWDER, FOR SOLUTION ORAL at 08:55

## 2022-12-15 RX ADMIN — Medication 10 ML: at 04:48

## 2022-12-15 RX ADMIN — HYDROMORPHONE HYDROCHLORIDE 2 MG: 2 TABLET ORAL at 17:27

## 2022-12-15 RX ADMIN — HYDROMORPHONE HYDROCHLORIDE 4 MG: 2 TABLET ORAL at 11:07

## 2022-12-15 RX ADMIN — DILTIAZEM HYDROCHLORIDE 120 MG: 120 CAPSULE, COATED, EXTENDED RELEASE ORAL at 08:58

## 2022-12-15 NOTE — PROGRESS NOTES
Problem: Falls - Risk of  Goal: *Absence of Falls  Description: Document Tatiana Aiken Fall Risk and appropriate interventions in the flowsheet.   Outcome: Progressing Towards Goal  Note: Fall Risk Interventions:  Mobility Interventions: Patient to call before getting OOB         Medication Interventions: Bed/chair exit alarm    Elimination Interventions: Call light in reach, Bed/chair exit alarm              Problem: Pain  Goal: *Control of Pain  Outcome: Progressing Towards Goal     Problem: Patient Education: Go to Patient Education Activity  Goal: Patient/Family Education  Outcome: Progressing Towards Goal     Problem: Complex Spine Procedure:  Post Op Day 2  Goal: Off Pathway (Use only if patient is Off Pathway)  Outcome: Progressing Towards Goal  Goal: Activity/Safety  Outcome: Progressing Towards Goal  Goal: Diagnostic Test/Procedures  Outcome: Progressing Towards Goal  Goal: Nutrition/Diet  Outcome: Progressing Towards Goal  Goal: Discharge Planning  Outcome: Progressing Towards Goal  Goal: Medications  Outcome: Progressing Towards Goal  Goal: Respiratory  Outcome: Progressing Towards Goal  Goal: Treatments/Interventions/Procedures  Outcome: Progressing Towards Goal  Goal: Psychosocial  Outcome: Progressing Towards Goal  Goal: *Progress independence mobility/activities (eg: Mobility precautions)  Outcome: Progressing Towards Goal  Goal: *Verbalizes/demonstrates understanding of proper body mechanics and use of stabilization device if ordered  Outcome: Progressing Towards Goal  Goal: *Optimal pain control at patient's stated goal  Outcome: Progressing Towards Goal  Goal: *Resumes normal function of bladder and bowel  Outcome: Progressing Towards Goal  Goal: *Hemodynamically stable  Outcome: Progressing Towards Goal  Goal: *Tolerating diet  Outcome: Progressing Towards Goal  Goal: *Labs within defined limits  Outcome: Progressing Towards Goal  Goal: *Able to place stabilization device  Outcome: Progressing Towards Goal

## 2022-12-15 NOTE — PROGRESS NOTES
12/15/2022 2:58 PM RW ordered through [facility] via [employer]. 12/15/2022 1:31 PM   Care Management Progress Note    Procedure(s):  L4-L5 LAMINECTOMY /FUSION / INSTRUMENTATION/TRANSFORAMINAL LUMBAR INTERBODY FUSION     Surgeon(s):  Carole Arriola MD    RUR:  8%  Risk Level: [x]Low []Moderate []High  Value-based purchasing: [] Yes [x] No  Bundle patient: [] Yes [x] No   Specify:     Transition of care plan:  Ongoing management by Ortho, Nephro consulted  Home with family at discharge   [facility] for PT/OT  Outpatient follow-up.   Pt's family to transport

## 2022-12-15 NOTE — PROGRESS NOTES
Problem: Mobility Impaired (Adult and Pediatric)  Goal: *Acute Goals and Plan of Care (Insert Text)  Description: FUNCTIONAL STATUS PRIOR TO ADMISSION: Patient was independent and active without use of DME.    HOME SUPPORT PRIOR TO ADMISSION: The patient lived with her daughter but did not require assist. Her second daughter will be providing additional assistance upon d /c     Physical Therapy Goals  Initiated 12/14/2022    1. Patient will move from supine to sit and sit to supine , scoot up and down, and roll side to side in bed with modified independence within 4 days. 2. Patient will perform sit to stand with modified independence within 4 days. 3. Patient will ambulate with modified independence for 150 feet with the least restrictive device within 4 days. 4. Patient will ascend/descend 3 stairs with 1-2 handrail(s) with minimal assistance/contact guard assist within 4 days. 5. Patient will verbalize and demonstrate understanding of spinal precautions (No bending, lifting greater than 5 lbs, or twisting; log-roll technique; frequent repositioning as instructed) within 4 days. 12/15/2022 1429 by Dariana Gore, PT  Outcome: Progressing Towards Goal  12/15/2022 1014 by Dariana Gore, PT  Outcome: Progressing Towards Goal   PHYSICAL THERAPY TREATMENT  Patient: Maldonado Barajas (37 y.o. female)  Date: 12/15/2022  Diagnosis: Pain, lumbar region [M54.50]  Spondylolisthesis of lumbar region [M43.16]  Spinal stenosis, lumbar region, with neurogenic claudication [M48.062]  Lumbar stenosis [M48.061] <principal problem not specified>  Procedure(s) (LRB):  L4-L5 LAMINECTOMY /FUSION / INSTRUMENTATION/TRANSFORAMINAL LUMBAR INTERBODY FUSION (N/A) 2 Days Post-Op  Precautions: Spinal, Fall  Chart, physical therapy assessment, plan of care and goals were reviewed. ASSESSMENT  Patient continues with skilled PT services and is progressing towards goals.  Patient this afternoon with fair tolerance and participation in physical therapy session. She reports improved pain control with dilaudid and improved \"heaviness\" of her RLE. Patient tolerates increases ambulation distance of 75ft without rest breaks. She desaturates to high 80s (as low as 85%) intermittently with heart rate between 100-110 bpm during activity. She denies palpitations and shortness of breathe. At conclusion of session patient spo2 92% on room air. Patient with poor sequencing of log roll today, reviewed and demonstrated proper form and recommend reviewing this at AM session tomorrow. Continue to recommend [place] upon d/c. Vitals:    12/15/22 0953 12/15/22 1122 12/15/22 1400 12/15/22 1410   BP: 116/64 126/76     BP 2:       BP 1 Location: Right upper arm Right upper arm     BP Patient Position: Sitting At rest Walking Sitting   Pulse: 86 86 (!) 113    Pulse 2:       Temp:  98.8 °F (37.1 °C)     Resp:  16     Height:       Weight:       SpO2: 93% 92% (!) 85% 92%         Current Level of Function Impacting Discharge (mobility/balance): CGA, emerging SBA with rW    Other factors to consider for discharge: none additional         PLAN :  Patient continues to benefit from skilled intervention to address the above impairments. Continue treatment per established plan of care. to address goals. Recommendation for discharge: (in order for the patient to meet his/her long term goals)  Physical therapy at least 2 days/week in the home     This discharge recommendation:  Has been made in collaboration with the attending provider and/or case management    IF patient discharges home will need the following DME: rolling walker       SUBJECTIVE:   Patient stated no I don't .  re: feel short of breath or palpitations    OBJECTIVE DATA SUMMARY:   Patient received supine in bed and was agreeable to participate in PT session. Patient was cleared by nursing to participate in PT session.    Patient's daughter present for session    Critical Behavior:  Neurologic State: Alert  Orientation Level: Oriented X4  Cognition: Appropriate decision making, Appropriate for age attention/concentration, Appropriate safety awareness, Follows commands  Safety/Judgement: Awareness of environment, Fall prevention  Functional Mobility Training:  Bed Mobility:  Rolling: Contact guard assistance;Minimum assistance  Supine to Sit: Minimum assistance     Scooting: Stand-by assistance        Transfers:  Sit to Stand: Contact guard assistance;Assist x1;Additional time  Stand to Sit: Contact guard assistance;Assist x1;Additional time        Bed to Chair: Contact guard assistance; Additional time;Assist x1                    Balance:  Sitting: Intact  Standing: Impaired; With support  Standing - Static: Good  Standing - Dynamic : Fair  Ambulation/Gait Training:  Distance (ft): 75 Feet (ft)  Assistive Device: Brace/Splint;Gait belt;Walker, rolling  Ambulation - Level of Assistance: Stand-by assistance;Contact guard assistance        Gait Abnormalities: Decreased step clearance;Trunk sway increased              Speed/Grisel: Pace decreased (<100 feet/min)  Step Length: Right shortened;Left shortened                    Stairs: Therapeutic Exercises:     Pain Rating:  Patient with minimal complaints of pain during therapy      Activity Tolerance:   Good and desaturates with exertion and requires oxygen    After treatment patient left in no apparent distress:   Sitting in chair, Call bell within reach, and Caregiver / family present    COMMUNICATION/COLLABORATION:   The patients plan of care was discussed with: Occupational therapist, Registered nurse, Case management, and ortho NP .      Mely Tabor PT, DPT   Time Calculation: 27 mins

## 2022-12-15 NOTE — PROGRESS NOTES
Problem: Falls - Risk of  Goal: *Absence of Falls  Description: Document Sofia Fothergill Fall Risk and appropriate interventions in the flowsheet.   Outcome: Progressing Towards Goal  Note: Fall Risk Interventions:  Mobility Interventions: Patient to call before getting OOB         Medication Interventions: Bed/chair exit alarm    Elimination Interventions: Call light in reach, Bed/chair exit alarm              Problem: Patient Education: Go to Patient Education Activity  Goal: Patient/Family Education  Outcome: Progressing Towards Goal     Problem: Pain  Goal: *Control of Pain  Outcome: Progressing Towards Goal     Problem: Patient Education: Go to Patient Education Activity  Goal: Patient/Family Education  Outcome: Progressing Towards Goal

## 2022-12-15 NOTE — PROGRESS NOTES
Problem: Mobility Impaired (Adult and Pediatric)  Goal: *Acute Goals and Plan of Care (Insert Text)  Description: FUNCTIONAL STATUS PRIOR TO ADMISSION: Patient was independent and active without use of DME.    HOME SUPPORT PRIOR TO ADMISSION: The patient lived with her daughter but did not require assist. Her second daughter will be providing additional assistance upon d /c     Physical Therapy Goals  Initiated 12/14/2022    1. Patient will move from supine to sit and sit to supine , scoot up and down, and roll side to side in bed with modified independence within 4 days. 2. Patient will perform sit to stand with modified independence within 4 days. 3. Patient will ambulate with modified independence for 150 feet with the least restrictive device within 4 days. 4. Patient will ascend/descend 3 stairs with 1-2 handrail(s) with minimal assistance/contact guard assist within 4 days. 5. Patient will verbalize and demonstrate understanding of spinal precautions (No bending, lifting greater than 5 lbs, or twisting; log-roll technique; frequent repositioning as instructed) within 4 days. Outcome: Progressing Towards Goal   PHYSICAL THERAPY TREATMENT  Patient: Doug Habermann (37 y.o. female)  Date: 12/15/2022  Diagnosis: Pain, lumbar region [M54.50]  Spondylolisthesis of lumbar region [M43.16]  Spinal stenosis, lumbar region, with neurogenic claudication [M48.062]  Lumbar stenosis [M48.061] <principal problem not specified>  Procedure(s) (LRB):  L4-L5 LAMINECTOMY /FUSION / INSTRUMENTATION/TRANSFORAMINAL LUMBAR INTERBODY FUSION (N/A) 2 Days Post-Op  Precautions: Spinal, Fall No bending, no lifting greater than 5 lbs, no twisting, log-roll technique, repositioning every 20-30 min except when sleeping, brace when OOB (if ordered)  Chart, physical therapy assessment, plan of care and goals were reviewed. ASSESSMENT  Patient continues with skilled PT services and is progressing towards goals.  Patient this morning with great participation and fair tolerance to physical therapy session secondary to elevated reports of pain 8/10. Despite elevated pain levels, patient tolerates increased ambulation distance of 60ft with CGA and 2 standing rest breaks of 1 minute each. Spo2 stable on room air mostly 90-93% during ambulation per tele box. Gait is slow, but progressing to step through gait pattern today. Continue to recommend HHPT follow up upon d/c. Current Level of Function Impacting Discharge (mobility/balance): CGA with RW    Other factors to consider for discharge: none additional         PLAN :  Patient continues to benefit from skilled intervention to address the above impairments. Continue treatment per established plan of care. to address goals. Recommendation for discharge: (in order for the patient to meet his/her long term goals)  Physical therapy at least 2 days/week in the home     This discharge recommendation:  Has been made in collaboration with the attending provider and/or case management    IF patient discharges home will need the following DME: rolling walker       SUBJECTIVE:   Patient stated [place] just feel heavy.  re: patient reports RLE heaviness today compared to yesterday     OBJECTIVE DATA SUMMARY:   Patient received seated in bedside chair, agreeable to participate in PT session. Patient's daughter present for entirety of session  Patient was cleared by nursing to participate in PT session. Critical Behavior:  Neurologic State: Alert  Orientation Level: Oriented X4  Cognition: Follows commands  Safety/Judgement: Awareness of environment, Fall prevention, Good awareness of safety precautions, Home safety, Insight into deficits    Spinal diagnosis intervention:  The patient stated 3/3 back precautions when prompted. Reviewed all 3 back precautions, log roll technique, and sitting for 30 minutes at a time.  The patient required no verbal cues to maintain back precautions during functional activity. Reviewed back brace application and wear schedule. Brace donned with supervision/set-up      Functional Mobility Training:    Bed Mobility:  Log          Scooting: Stand-by assistance        Transfers:  Sit to Stand: Contact guard assistance; Additional time  Stand to Sit: Contact guard assistance; Additional time        Bed to Chair: Contact guard assistance                    Balance:  Sitting: Intact  Standing: With support; Impaired  Standing - Static: Good  Standing - Dynamic : Fair  Ambulation/Gait Training:  Distance (ft): 60 Feet (ft)  Assistive Device: Brace/Splint;Gait belt;Walker, rolling  Ambulation - Level of Assistance: Contact guard assistance        Gait Abnormalities: Decreased step clearance; Step to gait;Trunk sway increased              Speed/Grisel: Pace decreased (<100 feet/min)  Step Length: Right shortened;Left shortened                  Therapeutic Exercises:     Pain Ratin/10 during session; RN and ortho PA aware    Activity Tolerance:   Fair and requires rest breaks    After treatment patient left in no apparent distress:   Sitting in chair, Call bell within reach, and Caregiver / family present    COMMUNICATION/COLLABORATION:   The patients plan of care was discussed with: Occupational therapist, Registered nurse, and ortho PA .      [facility] PT, DPT   Time Calculation: 29 mins

## 2022-12-15 NOTE — CONSULTS
[place] office   [address] [place] [address], [address] [place] [address]  Phone: [phone]  [phone]   [email or web address]    NEPHROLOGY CONSULT NOTE     Patient: Maryjo Shaikh MRN: [id number]  PCP: Meghann Cleaning MD   :     1970  Age:   46 y.o. Sex:  female      Referring physician: Isi Rick MD  Reason for consultation: ALIVIA  Admission Date: 2022  9:39 AM  LOS: 2 days        ASSESSMENT and PLAN :   ALIVIA likely d/t hemodynamic changes in the setting of recent back surgery  ? CKD stage G3 likely d/t HTN  HTN  S/P L4-L5 Laminectomy    Check urine lytes and UPC  Hold Losartan and chlorthalidone   Start LR @ 75 cc/hr  USG kidney and bladder   Bladder scan now  Avoid NSAID's             Subjective:   HPI: Maryjo Shaikh is a 46 y.o. female with likely mild CKD stage G3 with last cr 1.27 from , HTN, CAD s/p stent placement who admitted to orthopedic service for L4-L5 laminectomy and fusion on . Labs  cr 1.64 and today 1.82. patient is on losartan and chlorthalidone in the hospital . No NSAID exposure. Past Medical Hx:   Past Medical History:   Diagnosis Date    Arthritis     Osteo? Lumbar    CAD (coronary artery disease)     Depression 2022    Major Depressive d/o,panic attacks, chlostrophobia, agoraphobia    Fibroid     Hypertension     Age 14/Resistant    MI (myocardial infarction) ([facility] [address] 2020    Prox RCA 2 stents    Psychiatric disorder     Vertigo     s/p Concussion         Past Surgical Hx:     Past Surgical History:   Procedure Laterality Date    HX GYN      2004 endometrial ablation    HX PARTIAL HYSTERECTOMY  2012    still needs paps    HX TUBAL LIGATION      hysterectomy        Medications:  Prior to Admission medications    Medication Sig Start Date End Date Taking? Authorizing Provider   chlorthalidone (HYGROTON) 25 mg tablet Take 25 mg by mouth daily.    Yes Provider, Historical   hydrALAZINE (APRESOLINE) 25 mg tablet Take 25 mg by mouth two (2) times a day. Yes Provider, Historical   atorvastatin (LIPITOR) 20 mg tablet Take  by mouth nightly. Yes Provider, Historical   losartan (COZAAR) 100 mg tablet Take 100 mg by mouth daily. Yes Provider, Historical   dilTIAZem ER (TIAZAC) 120 mg capsule Take 120 mg by mouth daily. Yes Provider, Historical   labetaloL (NORMODYNE) 200 mg tablet Take 200 mg by mouth two (2) times a day. Yes Provider, Historical   aspirin delayed-release 81 mg tablet Take  by mouth daily. Yes Provider, Historical   amLODIPine (NORVASC) 5 mg tablet Take 5 mg by mouth daily. Yes Provider, Historical   ticagrelor (BRILINTA PO) Take 90 mg by mouth two (2) times a day. Provider, Historical   nitroglycerin (NITROSTAT) 0.3 mg SL tablet 0.3 mg by SubLINGual route every five (5) minutes as needed for Chest Pain. Provider, Historical       Allergies   Allergen Reactions    Mobic [Meloxicam] Itching       Social Hx:  reports that she has never smoked. She has never used smokeless tobacco. She reports that she does not drink alcohol and does not use drugs. Family History   Problem Relation Age of Onset    Hypertension Father     Diabetes Father 39       Review of Systems:  A twelve point review of system was performed today. Pertinent positives and negatives are mentioned in the HPI. The reminder of the ROS is negative and noncontributory. Objective:    Vitals:    Vitals:    12/14/22 2305 12/15/22 0414 12/15/22 0729 12/15/22 0953   BP: 112/70 110/67 113/68 116/64   Pulse: 73 86 85 86   Resp: 16 16 17    Temp: 98.5 °F (36.9 °C) 99 °F (37.2 °C) 98.8 °F (37.1 °C)    SpO2: 91% 92% 90% 93%   Weight:       Height:         I&O's:  12/14 0701 - 12/15 0700  In: 4180.4 [P.O.:1270;  I.V.:2910.4]  Out: 60 [Drains:60]  Visit Vitals  /64 (BP 1 Location: Right upper arm, BP Patient Position: Sitting)   Pulse 86   Temp 98.8 °F (37.1 °C)   Resp 17   Ht 5' 6\" (1.676 m)   Wt 106 kg (233 lb 11 oz)   LMP 09/10/2004   SpO2 93%   BMI 37.72 kg/m²       Physical Exam:    GENERAL : Lying down in bed with no acute distress  HEENT: AT [facility]   CVS: S1 S2 RRR, no murmur or gallops heard  RS: CTABL, no rhonchi,or wheezing heard  ABDOMEN: soft NT ND positive BS  EXTREMITY: trace  edema no clubbing or cyanosis, pedal pulse +  NEUROLOGY: AAA X3, no focal deficit or asterixis      Laboratory Results:    BMP:   Lab Results   Component Value Date/Time     12/15/2022 03:36 AM    K 3.5 12/15/2022 03:36 AM     12/15/2022 03:36 AM    CO2 25 12/15/2022 03:36 AM    AGAP 9 12/15/2022 03:36 AM     (H) 12/15/2022 03:36 AM    BUN 28 (H) 12/15/2022 03:36 AM    CREA 1.82 (H) 12/15/2022 03:36 AM         No results found for this or any previous visit. No results found for this visit on 12/13/22 (from the past 12 hour(s)). Thank you for consulting [facility] in the care of your patient.

## 2022-12-15 NOTE — PROGRESS NOTES
ORTHOPAEDIC LUMBAR FUSION PROGRESS NOTE    NAME:     Earl Roberson   :       1970   MRN:       [id number]   DATE:      12/15/2022    POD:    2 Days Post-Op  S/P:    Procedure(s):  L4-L5 LAMINECTOMY [place] / INSTRUMENTATION/TRANSFORAMINAL LUMBAR INTERBODY FUSION    SUBJECTIVE:    C/O back pain along surgical incision, having radiating pain across bilateral buttocks  Notes that the numbness/tingling in her R lower leg and foot seems improved today  No leg pain   Denies nausea/vomiting, headache, chest pain or shortness of breath    Recent Labs     12/15/22  0336   HGB 9.5*      K 3.5      CO2 25   BUN 28*   CREA 1.82*   *     Patient Vitals for the past 12 hrs:   BP Temp Pulse Resp SpO2   12/15/22 1455 118/70 98.6 °F (37 °C) 86 17 90 %   12/15/22 1410 -- -- -- -- 92 %   12/15/22 1400 -- -- (!) 113 -- (!) 85 %   12/15/22 1122 126/76 98.8 °F (37.1 °C) 86 16 92 %   12/15/22 0953 116/64 -- 86 -- 93 %   12/15/22 0729 113/68 98.8 °F (37.1 °C) 85 17 90 %   12/15/22 0414 110/67 99 °F (37.2 °C) 86 16 92 %       EXAM:  Dressings clean, dry and intact   Positive strength/ROM bilat lower ext.   Neuro intact to sensation  Calves, soft & nontender  BL LEs NVID      PLAN:  Continue prn PO pain medications- change to dilaudid for better pain control   IV decadron  Consult Nephrology for elevated Cr  Monitor hemovac drain output  Maintain SCDs  PT/OT, OOB w/ assist  Katherine Harding [place]  Orthopaedic Surgery  Physician Assistant to Dr. Tere Pichardo

## 2022-12-15 NOTE — PROGRESS NOTES
Problem: Self Care Deficits Care Plan (Adult)  Goal: *Acute Goals and Plan of Care (Insert Text)  Description: FUNCTIONAL STATUS PRIOR TO ADMISSION: Patient was independent without use of DME. Patient was modified independent for basic ADLs, however reports that recently daughter's had to assist with LB ADLs and ADL transfers for safety due to periodic LE numbness. HOME SUPPORT: The patient lived with daughter who assisted as needed. Second daughter will be coming from out of town to assist as well. Occupational Therapy Goals  Initiated 12/14/2022    1. Patient will perform lower body dressing with modified independence using LH AE PRN within 7 days. 2.  Patient will perform toileting and toilet transfer with modified independence using most appropriate DME within 7 days. 3.  Patient will perform standing grooming at supervision/set-up within 7 days. 4.  Patient will don/doff back brace at supervision/set-up within 7 days. 5.  Patient will verbalize/demonstrate 3/3 back precautions during ADL tasks without cues within 7 days. Outcome: Progressing Towards Goal     OCCUPATIONAL THERAPY TREATMENT  Patient: Irina Johnson (78 y.o. female)  Date: 12/15/2022  Diagnosis: Pain, lumbar region [M54.50]  Spondylolisthesis of lumbar region [M43.16]  Spinal stenosis, lumbar region, with neurogenic claudication [M48.062]  Lumbar stenosis [M48.061] <principal problem not specified>  Procedure(s) (LRB):  L4-L5 LAMINECTOMY /FUSION / INSTRUMENTATION/TRANSFORAMINAL LUMBAR INTERBODY FUSION (N/A) 2 Days Post-Op  Precautions: Spinal, Fall  Chart, occupational therapy assessment, plan of care, and goals were reviewed. ASSESSMENT  Patient continues with skilled OT services and is progressing towards goals. Ms. Burak Pacheco continues to present with increased pain, slow pace of activity, and decreased strength and activity tolerance.  She denies nausea this session and is able to progress mobility to bathroom for toileting and standing grooming activity with overall CGA. Pt continues to be receptive to all education and cues and requires increased time for all mobility/tasks and is most limited by pain. Current Level of Function Impacting Discharge (ADLs): set up to min/mod A for ADLs; CGA for ADL transfers    Other factors to consider for discharge: pain control; very supportive daughters; back precautions         PLAN :  Patient continues to benefit from skilled intervention to address the above impairments. Continue treatment per established plan of care to address goals. Recommend with staff: OOB to chair for meals; mobility to bathroom for toileting; ADLs with assist as needed    Recommend next OT session: LB dressing with AE    Recommendation for discharge: (in order for the patient to meet his/her long term goals)  Occupational therapy at least 2 days/week in the home     This discharge recommendation:  Has been made in collaboration with the attending provider and/or case management    IF patient discharges home will need the following DME: [address] AE; grab bars/raised toilet seat       SUBJECTIVE:   Patient stated It just hurts.     OBJECTIVE DATA SUMMARY:   Cognitive/Behavioral Status:  Neurologic State: Alert  Orientation Level: Oriented X4  Cognition: Appropriate decision making; Appropriate for age attention/concentration; Appropriate safety awareness; Follows commands  Perception: Appears intact  Perseveration: No perseveration noted  Safety/Judgement: Awareness of environment; Fall prevention    Functional Mobility and Transfers for ADLs:  Bed Mobility:  Rolling: Contact guard assistance;Minimum assistance  Supine to Sit: Minimum assistance  Scooting: Stand-by assistance    Transfers:  Sit to Stand: Contact guard assistance;Assist x1;Additional time  Functional Transfers  Bathroom Mobility: Contact guard assistance  Toilet Transfer : Contact guard assistance; Additional time  Cues: Verbal cues provided  Adaptive Equipment: Grab bars; Walker (comment)  Bed to Chair: Contact guard assistance; Additional time;Assist x1    Balance:  Sitting: Intact  Standing: Impaired; With support  Standing - Static: Good  Standing - Dynamic : Fair    ADL Intervention:  Grooming  Grooming Assistance: Stand-by assistance;Contact guard assistance  Position Performed: Standing (at sink with RW)  Washing Hands: Stand-by assistance;Contact guard assistance  Cues: Verbal cues provided    Toileting  Bladder Hygiene: Contact guard assistance (standing at RW)  Clothing Management: Contact guard assistance  Cues: Verbal cues provided  Adaptive Equipment: Grab bars; Walker    Cognitive Retraining  Safety/Judgement: Awareness of environment; Fall prevention    The patient recalled and demonstrated 3/3 back precautions. Reviewed all 3 with patient. Patient instructed and indicated understanding the benefits of maintaining activity tolerance, functional mobility, and independence with self-care tasks during acute stay  to ensure safe return home and to baseline. Encouraged patient to increase frequency and duration OOB, not sitting longer than 30 mins without marching/walking with staff, be out of bed for all meals, perform daily ADLs (as approved by RN/MD regarding bathing etc.), and performing functional mobility to/from bathroom. Patient instruction and indicated understanding on body mechanics, ergonomics and gravitational force on the spine during different body positions to plan activities in prep for return home to complete basic ADLs, instrumental ADLs and back to work safely. Dressing brace: Patient instructed and demonstrated to don/doff Velcro on brace using dominant side, keeping non-dominant side intact. Patient instructed and demonstrated in meantime of being able to stand with back against wall to don/doff brace, to don/doff seated using lap and bed/chair surface to support brace while manipulating.   Toileting: Patient instructed on the benefits of using flushable wet wipes and toilet tongs if decreased reach or pain for gray care. Also, the benefits of a reacher to aid in clothing management. Patient instruction and indicated understanding to not strain i.e. holding breath to bear down during a bowel movement, lifting/activity, and sexual activity. Home safety: Patient instructed and indicated understanding on home modifications and safety [raise height of ADL objects (i.e. clothing, sink items, fridge items, items to mouth when grooming), appropriate height of chair surfaces, recliner safety, change of floor surfaces, clear pathways] to increase independence and fall prevention. Standing: Patient instructed and indicated understanding to walk up to sink/countertop/surfaces, step into walker, square off while using objects, slide objects along surfaces, to increase adherence to back precautions and fall prevention. Patient instructed to increase amount of time standing to increase independence and tolerance with ADLs. During prolonged standing, can open cabinet door or place foot on stool to decrease spinal pressure/increase pain. Pain:  Pt reporting minimal pain    Activity Tolerance:   Fair; most limited by pain    After treatment patient left in no apparent distress:   Sitting in chair and Call bell within reach    COMMUNICATION/COLLABORATION:   The patients plan of care was discussed with: Physical therapist and Registered nurse.      Damaris Skelton OT  Time Calculation: 34 mins

## 2022-12-15 NOTE — PROGRESS NOTES
ORTHOPAEDIC LUMBAR FUSION PROGRESS NOTE    NAME:     Catalina Taylor   :       1970   MRN:       [id number]   DATE:      2022    POD:    1 Day Post-Op  S/P:    Procedure(s):  L4-L5 LAMINECTOMY Deadra Ireland / INSTRUMENTATION/TRANSFORAMINAL LUMBAR INTERBODY FUSION    SUBJECTIVE:    C/O back pain along surgical incision, having some numbness/tingling to R lower leg and foot (had preop)  No leg pain  Denies nausea/vomiting, headache, chest pain or shortness of breath  Pain controlled     Latest Reference Range & Units 22 03:11   HGB 11.5 - 16.0 g/dL 11.2 (L)   Sodium 136 - 145 mmol/L 138   Potassium 3.5 - 5.1 mmol/L 4.2   Chloride 97 - 108 mmol/L 104   CO2 21 - 32 mmol/L 25   Anion gap 5 - 15 mmol/L 9   Glucose 65 - 100 mg/dL 171 (H)   BUN 6 - 20 MG/DL 19   Creatinine 0.55 - 1.02 MG/DL 1.64 (H)   BUN/Creatinine ratio 12 - 20   12   Calcium 8.5 - 10.1 MG/DL 8.4 (L)   eGFR >60 ml/min/1.73m2 37 (L)   (L): Data is abnormally low  (H): Data is abnormally high      22 1530 98.3 °F (36.8 °C) 79 130/70 90 -- Lying; At rest 16 98 % -- -- -- --   22 1406 -- 75 136/61 86 -- Sitting -- -- -- -- -- --   22 1402 -- 88 142/62 89 -- Sitting -- 93 % None (Room air) -- -- --   22 1343 -- 95 152/79   103 -- Sitting -- 92 % None (Room air) -- -- --   22 1339 -- 91 127/70 89 -- Supine -- -- -- -- -- --   22 1143 98.3 °F (36.8 °C) 91 133/76 95 -- Lying; At rest 18 95 % -- -- -- --   22 0926 -- 85 130/76 94 -- Sitting -- 92 % None (Room air) -- -- --   22 0916 -- 95 143/85  104 -- Sitting -- 96 % None (Room air) -- -- --   22 0912 -- 76 121/73 89 -- Supine -- -- -- -- -- --   22 0810 97.9 °F (36.6 °C) 89 135/90  105 -- Lying; At rest 14 97 % -- -- -- --   22 0800 -- -- -- -- -- -- -- -- None (Room air) -- -- --   22 0318 97.5 °F (36.4 °C) 90 130/92   105 -- At rest 12 96 % Nasal cannula 2 l/min -- --       EXAM:  Dressings clean, dry and intact   Positive strength/ROM bilat lower ext.   Neuro intact to sensation  Calves, soft & nontender  BL LEs NVID      PLAN:  D/C PCA, change to prn PO pain medications  Continue IVFs  Monitor hemovac drain output  Maintain SCDs  PT/OT, OOB w/ assist  Advance diet as tolerated      Kingsley Tapia, [address]  Orthopaedic Surgery  Physician Assistant to Dr. Jairo Bowles

## 2022-12-16 ENCOUNTER — APPOINTMENT (OUTPATIENT)
Dept: VASCULAR SURGERY | Age: 52
DRG: 304 | End: 2022-12-16
Attending: PHYSICIAN ASSISTANT
Payer: MEDICAID

## 2022-12-16 LAB
ANION GAP SERPL CALC-SCNC: 7 MMOL/L (ref 5–15)
BUN SERPL-MCNC: 25 MG/DL (ref 6–20)
BUN/CREAT SERPL: 20 (ref 12–20)
CALCIUM SERPL-MCNC: 8.8 MG/DL (ref 8.5–10.1)
CHLORIDE SERPL-SCNC: 105 MMOL/L (ref 97–108)
CO2 SERPL-SCNC: 26 MMOL/L (ref 21–32)
CREAT SERPL-MCNC: 1.25 MG/DL (ref 0.55–1.02)
CREAT UR-MCNC: 52 MG/DL
CREAT UR-MCNC: 55.8 MG/DL
GLUCOSE SERPL-MCNC: 155 MG/DL (ref 65–100)
HGB BLD-MCNC: 9.5 G/DL (ref 11.5–16)
MICROALBUMIN UR-MCNC: 2.11 MG/DL
MICROALBUMIN/CREAT UR-RTO: 38 MG/G (ref 0–30)
POTASSIUM SERPL-SCNC: 3.6 MMOL/L (ref 3.5–5.1)
PROT UR-MCNC: 33 MG/DL (ref 0–11.9)
PROT/CREAT UR-RTO: 0.6
SODIUM SERPL-SCNC: 138 MMOL/L (ref 136–145)

## 2022-12-16 PROCEDURE — 94761 N-INVAS EAR/PLS OXIMETRY MLT: CPT

## 2022-12-16 PROCEDURE — 65270000029 HC RM PRIVATE

## 2022-12-16 PROCEDURE — 74011250637 HC RX REV CODE- 250/637: Performed by: INTERNAL MEDICINE

## 2022-12-16 PROCEDURE — 80048 BASIC METABOLIC PNL TOTAL CA: CPT

## 2022-12-16 PROCEDURE — 74011250637 HC RX REV CODE- 250/637: Performed by: ORTHOPAEDIC SURGERY

## 2022-12-16 PROCEDURE — 97116 GAIT TRAINING THERAPY: CPT

## 2022-12-16 PROCEDURE — 97535 SELF CARE MNGMENT TRAINING: CPT

## 2022-12-16 PROCEDURE — 82043 UR ALBUMIN QUANTITATIVE: CPT

## 2022-12-16 PROCEDURE — 36415 COLL VENOUS BLD VENIPUNCTURE: CPT

## 2022-12-16 PROCEDURE — 85018 HEMOGLOBIN: CPT

## 2022-12-16 PROCEDURE — 97530 THERAPEUTIC ACTIVITIES: CPT

## 2022-12-16 PROCEDURE — 74011000250 HC RX REV CODE- 250: Performed by: ORTHOPAEDIC SURGERY

## 2022-12-16 PROCEDURE — 84156 ASSAY OF PROTEIN URINE: CPT

## 2022-12-16 PROCEDURE — 74011250637 HC RX REV CODE- 250/637: Performed by: NURSE PRACTITIONER

## 2022-12-16 PROCEDURE — 93970 EXTREMITY STUDY: CPT

## 2022-12-16 RX ORDER — ERGOCALCIFEROL 1.25 MG/1
50000 CAPSULE ORAL DAILY
Status: DISCONTINUED | OUTPATIENT
Start: 2022-12-16 | End: 2022-12-16

## 2022-12-16 RX ORDER — CHOLECALCIFEROL TAB 125 MCG (5000 UNIT) 125 MCG
5000 TAB ORAL DAILY
Status: DISCONTINUED | OUTPATIENT
Start: 2022-12-16 | End: 2022-12-17 | Stop reason: HOSPADM

## 2022-12-16 RX ADMIN — AMLODIPINE BESYLATE 5 MG: 5 TABLET ORAL at 08:49

## 2022-12-16 RX ADMIN — HYDRALAZINE HYDROCHLORIDE 25 MG: 25 TABLET, FILM COATED ORAL at 08:48

## 2022-12-16 RX ADMIN — Medication 10 ML: at 22:45

## 2022-12-16 RX ADMIN — CHOLECALCIFEROL TAB 125 MCG (5000 UNIT) 5000 UNITS: 125 TAB at 09:00

## 2022-12-16 RX ADMIN — POLYETHYLENE GLYCOL 3350 17 G: 17 POWDER, FOR SOLUTION ORAL at 08:48

## 2022-12-16 RX ADMIN — HYDRALAZINE HYDROCHLORIDE 25 MG: 25 TABLET, FILM COATED ORAL at 22:44

## 2022-12-16 RX ADMIN — HYDROMORPHONE HYDROCHLORIDE 4 MG: 2 TABLET ORAL at 22:49

## 2022-12-16 RX ADMIN — SENNOSIDES AND DOCUSATE SODIUM 1 TABLET: 50; 8.6 TABLET ORAL at 18:08

## 2022-12-16 RX ADMIN — HYDROMORPHONE HYDROCHLORIDE 2 MG: 2 TABLET ORAL at 08:48

## 2022-12-16 RX ADMIN — LOSARTAN POTASSIUM 100 MG: 50 TABLET, FILM COATED ORAL at 08:49

## 2022-12-16 RX ADMIN — ATORVASTATIN CALCIUM 20 MG: 20 TABLET, FILM COATED ORAL at 22:44

## 2022-12-16 RX ADMIN — SENNOSIDES AND DOCUSATE SODIUM 1 TABLET: 50; 8.6 TABLET ORAL at 08:48

## 2022-12-16 RX ADMIN — LABETALOL HYDROCHLORIDE 200 MG: 100 TABLET, FILM COATED ORAL at 22:45

## 2022-12-16 RX ADMIN — CYCLOBENZAPRINE 10 MG: 10 TABLET, FILM COATED ORAL at 11:50

## 2022-12-16 RX ADMIN — HYDROMORPHONE HYDROCHLORIDE 4 MG: 2 TABLET ORAL at 12:50

## 2022-12-16 RX ADMIN — DILTIAZEM HYDROCHLORIDE 120 MG: 120 CAPSULE, COATED, EXTENDED RELEASE ORAL at 08:49

## 2022-12-16 RX ADMIN — FAMOTIDINE 20 MG: 20 TABLET, FILM COATED ORAL at 18:08

## 2022-12-16 RX ADMIN — FAMOTIDINE 20 MG: 20 TABLET, FILM COATED ORAL at 08:48

## 2022-12-16 RX ADMIN — LABETALOL HYDROCHLORIDE 200 MG: 100 TABLET, FILM COATED ORAL at 08:49

## 2022-12-16 RX ADMIN — HYDROMORPHONE HYDROCHLORIDE 2 MG: 2 TABLET ORAL at 18:08

## 2022-12-16 NOTE — PROGRESS NOTES
Jessica Sprague  YOB: 1970      Assessment & Plan: ALIVIA likely d/t hemodynamic changes in the setting of recent back surgery  ? CKD stage G3 likely d/t HTN  HTN  S/P L4-L5 Laminectomy     Cr/GFR better with IVF  Resume losartan and hold chlorthalidone  continue LR @ 75 cc/hr  USG kidney and bladder reviewed.   Avoid NSAID's  Need follow up @ office  upon DC          Subjective:   CC: F/U ALIVIA  HPI: Patient seen ,feeling better cr 1.2 from 1.8  ROS:  Current Facility-Administered Medications   Medication Dose Route Frequency    cholecalciferol (VITAMIN D3) (5000 Units/125 mcg) tablet 5,000 Units  5,000 Units Oral DAILY    HYDROmorphone (DILAUDID) tablet 2 mg  2 mg Oral Q4H PRN    HYDROmorphone (DILAUDID) tablet 4 mg  4 mg Oral Q4H PRN    lactated Ringers infusion  75 mL/hr IntraVENous CONTINUOUS    labetaloL (NORMODYNE) tablet 200 mg  200 mg Oral BID    atorvastatin (LIPITOR) tablet 20 mg  20 mg Oral QHS    losartan (COZAAR) tablet 100 mg  100 mg Oral DAILY    hydrALAZINE (APRESOLINE) tablet 25 mg  25 mg Oral BID    amLODIPine (NORVASC) tablet 5 mg  5 mg Oral DAILY    dilTIAZem ER (CARDIZEM CD) capsule 120 mg  120 mg Oral DAILY    [Held by provider] chlorthalidone (HYGROTON) tablet 25 mg  25 mg Oral DAILY    sodium chloride (NS) flush 5-40 mL  5-40 mL IntraVENous Q8H    sodium chloride (NS) flush 5-40 mL  5-40 mL IntraVENous PRN    naloxone (NARCAN) injection 0.4 mg  0.4 mg IntraVENous PRN    senna-docusate (PERICOLACE) 8.6-50 mg per tablet 1 Tablet  1 Tablet Oral BID    polyethylene glycol (MIRALAX) packet 17 g  17 g Oral DAILY    bisacodyL (DULCOLAX) suppository 10 mg  10 mg Rectal DAILY PRN    acetaminophen (TYLENOL) tablet 650 mg  650 mg Oral Q6H PRN    famotidine (PEPCID) tablet 20 mg  20 mg Oral BID    cyclobenzaprine (FLEXERIL) tablet 10 mg  10 mg Oral TID PRN    diphenhydrAMINE (BENADRYL) injection 12.5 mg  12.5 mg IntraVENous Q6H PRN          Objective:     Vitals:  Blood pressure (!) 159/90, pulse 86, temperature 98.7 °F (37.1 °C), resp. rate 17, height 5' 6\" (1.676 m), weight 106 kg (233 lb 11 oz), last menstrual period 09/10/2004, SpO2 97 %. Temp (24hrs), Av.7 °F (37.1 °C), Min:98.5 °F (36.9 °C), Max:99.1 °F (37.3 °C)      Intake and Output:  No intake/output data recorded.  1901 -  0700  In: 3890.4 [P.O.:400; I.V.:3490.4]  Out: 68 [Urine:2; Drains:75]    Physical Exam:                   Physical Examination:   GENERAL ASSESSMENT: NAD  HEENT:Nontraumatic   CHEST: CTA  HEART: S1S2  ABDOMEN: Soft,NT,  :Banerjee:   EXTREMITY: trace EDEMA  NEURO:Grossly non focal          ECG/rhythm:    Data Review      No results for input(s): TNIPOC in the last 72 hours. No lab exists for component: ITNL   No results for input(s): CPK, CKMB, TROIQ in the last 72 hours. Recent Labs     22  0301 12/15/22  0336 22  0311    137 138   K 3.6 3.5 4.2    103 104   CO2 26 25 25   BUN 25* 28* 19   CREA 1.25* 1.82* 1.64*   * 139* 171*   CA 8.8 8.0* 8.4*   HGB 9.5* 9.5* 11.2*      No results for input(s): INR, PTP, APTT, INREXT in the last 72 hours. Needs: urine analysis, urine sodium, protein and creatinine  Lab Results   Component Value Date/Time    Sodium,urine random 44 12/15/2022 05:28 PM    Creatinine, urine random 76.00 12/15/2022 05:28 PM               Discussed with:   patient     : Jerry Nguyễn MD  2022        [facility]:  [email or web address]  [facility] office:  [address] [place] [address], [address] [place] [address] [address]  [place], [address] [place] [address]  Phone: [phone]  Fax :     [phone]    [place] office:  [address] [place] [address]  Almaz Pacheco  Phone - [phone]  Fax - [phone]

## 2022-12-16 NOTE — PROGRESS NOTES
Problem: Self Care Deficits Care Plan (Adult)  Goal: *Acute Goals and Plan of Care (Insert Text)  Description: FUNCTIONAL STATUS PRIOR TO ADMISSION: Patient was independent without use of DME. Patient was modified independent for basic ADLs, however reports that recently daughter's had to assist with LB ADLs and ADL transfers for safety due to periodic LE numbness. HOME SUPPORT: The patient lived with daughter who assisted as needed. Second daughter will be coming from out of town to assist as well. Occupational Therapy Goals  Initiated 12/14/2022    1. Patient will perform lower body dressing with modified independence using LH AE PRN within 7 days. 2.  Patient will perform toileting and toilet transfer with modified independence using most appropriate DME within 7 days. 3.  Patient will perform standing grooming at supervision/set-up within 7 days. 4.  Patient will don/doff back brace at supervision/set-up within 7 days. 5.  Patient will verbalize/demonstrate 3/3 back precautions during ADL tasks without cues within 7 days. Outcome: Progressing Towards Goal   OCCUPATIONAL THERAPY TREATMENT  Patient: Palak Tejada (59 y.o. female)  Date: 12/16/2022  Diagnosis: Pain, lumbar region [M54.50]  Spondylolisthesis of lumbar region [M43.16]  Spinal stenosis, lumbar region, with neurogenic claudication [M48.062]  Lumbar stenosis [M48.061] <principal problem not specified>  Procedure(s) (LRB):  L4-L5 LAMINECTOMY /FUSION / INSTRUMENTATION/TRANSFORAMINAL LUMBAR INTERBODY FUSION (N/A) 3 Days Post-Op  Precautions: Spinal, Fall  Chart, occupational therapy assessment, plan of care, and goals were reviewed. ASSESSMENT  Patient continues with skilled OT services and is progressing towards goals. Dopplers negative for DVT. Pt seen for ADL re-training, ambulated to restroom to transfer with stand by assist to commode, able to manage her own hygiene and clothing.  She stood at the sink to wash her hands and returned to sit in chair. Educated her as to AE for bathing and dressing, pt practiced with use of sock aide and stand by assist overall. Pt left seated in chair. Current Level of Function Impacting Discharge (ADLs): stand by assist LB dress, toileting    Other factors to consider for discharge:          PLAN :  Patient continues to benefit from skilled intervention to address the above impairments. Continue treatment per established plan of care to address goals. Recommend with staff: out of bed for ADl's there ex, there act, meals    Recommend next OT session: cont towards goals    Recommendation for discharge: (in order for the patient to meet his/her long term goals)  Occupational therapy at least 2 days/week in the home     This discharge recommendation:    Not discussed with   IF patient discharges home will need the following DME: AE: long handled dressing       SUBJECTIVE:   Patient stated I like this.  re: reacher    OBJECTIVE DATA SUMMARY:   Cognitive/Behavioral Status:  Neurologic State: Alert  Orientation Level: Oriented X4  Cognition: Appropriate decision making             Functional Mobility and Transfers for ADLs:  Bed Mobility:  Rolling: Stand-by assistance; Adaptive equipment  Supine to Sit: Stand-by assistance; Adaptive equipment  Scooting: Stand-by assistance    Transfers:  Sit to Stand: Contact guard assistance     Bed to Chair: Contact guard assistance    Balance:  Sitting: Intact; With support  Standing: With support; Intact    ADL Intervention:       Grooming  Grooming Assistance: Stand-by assistance  Position Performed: Standing  Washing Hands: Stand-by assistance                   Upper Body Dressing Assistance  Orthotics(Brace): Stand-by assistance    Lower Body Dressing Assistance  Dressing Assistance: Stand-by assistance  Socks: Stand-by assistance  Position Performed: Seated in chair  Adaptive Equipment Used: Sock aid       Activity Tolerance:   Fair    After treatment patient left in no apparent distress:   Sitting in chair and Call bell within reach    COMMUNICATION/COLLABORATION:   The patients plan of care was discussed with: Occupational therapist and Registered nurse.      STEPHEN Stevens/L  Time Calculation: 19 mins

## 2022-12-16 NOTE — PROGRESS NOTES
ORTHOPAEDIC LUMBAR FUSION PROGRESS NOTE    NAME:     Lilia Dunlap   :       1970   MRN:       [id number]   DATE:      2022    POD:    3 Days Post-Op  S/P:    Procedure(s):  L4-L5 LAMINECTOMY Gordy Lee / INSTRUMENTATION/TRANSFORAMINAL LUMBAR INTERBODY FUSION    SUBJECTIVE:    C/O back pain along surgical incision, numbness/tingling in R lower leg and foot seem improved  No leg pain  Pt notes having some swelling in her lower legs  Denies nausea/vomiting, headache, chest pain or shortness of breath  Pain controlled    Recent Labs     22  0301   HGB 9.5*      K 3.6      CO2 26   BUN 25*   CREA 1.25*   *     Patient Vitals for the past 12 hrs:   BP Temp Pulse Resp SpO2   22 1153 136/87 98.7 °F (37.1 °C) 96 18 93 %   22 0828 (!) 159/90 98.7 °F (37.1 °C) 86 17 97 %   22 0355 134/76 98.7 °F (37.1 °C) 84 17 94 %       EXAM:  Dressings clean, dry and intact   Positive strength/ROM bilat lower ext. Neuro intact to sensation  Calves, soft & nontender.  Mild swelling noted to bilateral ankles and feet  BL LEs NVID      PLAN:  Continue prn PO pain medications  Monitor hemovac drain output  Maintain SCDs  PT/OT, OOB w/ assist  Tolerating diet  Will get bilateral LE dopplers to r/o DVT  Appreciate Nephrology rec's      Kingsley Tapia, [place]  Orthopaedic Surgery  Physician Assistant to Dr. Jairo Bowles

## 2022-12-16 NOTE — PROGRESS NOTES
12/16/2022 11:12 AM   Care Management Progress Note     Procedure(s):  L4-L5 LAMINECTOMY /FUSION / INSTRUMENTATION/TRANSFORAMINAL LUMBAR INTERBODY FUSION     Surgeon(s):  Kilo Rai MD     RUR:  8%  Risk Level: [x]Low []Moderate []High  Value-based purchasing: [] Yes [x] No  Bundle patient: [] Yes [x] No              Specify:      Transition of care plan:  Ongoing management by Ortho, Nephro consulted and following   Home with family at discharge   [facility] for PT/OT  Outpatient follow-up.   Pt's family to transport

## 2022-12-16 NOTE — PROGRESS NOTES
Problem: Mobility Impaired (Adult and Pediatric)  Goal: *Acute Goals and Plan of Care (Insert Text)  Description: FUNCTIONAL STATUS PRIOR TO ADMISSION: Patient was independent and active without use of DME.    HOME SUPPORT PRIOR TO ADMISSION: The patient lived with her daughter but did not require assist. Her second daughter will be providing additional assistance upon d /c     Physical Therapy Goals  Initiated 12/14/2022    1. Patient will move from supine to sit and sit to supine , scoot up and down, and roll side to side in bed with modified independence within 4 days. 2. Patient will perform sit to stand with modified independence within 4 days. 3. Patient will ambulate with modified independence for 150 feet with the least restrictive device within 4 days. 4. Patient will ascend/descend 3 stairs with 1-2 handrail(s) with minimal assistance/contact guard assist within 4 days. 5. Patient will verbalize and demonstrate understanding of spinal precautions (No bending, lifting greater than 5 lbs, or twisting; log-roll technique; frequent repositioning as instructed) within 4 days.       12/16/2022 1513 by Jnoi Walls, PT  Outcome: Progressing Towards Goal  12/16/2022 1025 by Joni Walls, PT  Outcome: Progressing Towards Goal   PHYSICAL THERAPY TREATMENT  Patient: Yany Nunez (89 y.o. female)  Date: 12/16/2022  Diagnosis: Pain, lumbar region [M54.50]  Spondylolisthesis of lumbar region [M43.16]  Spinal stenosis, lumbar region, with neurogenic claudication [M48.062]  Lumbar stenosis [M48.061] <principal problem not specified>  Procedure(s) (LRB):  L4-L5 LAMINECTOMY /FUSION / INSTRUMENTATION/TRANSFORAMINAL LUMBAR INTERBODY FUSION (N/A) 3 Days Post-Op  Precautions: Spinal, Fall No bending, no lifting greater than 5 lbs, no twisting, log-roll technique, repositioning every 20-30 min except when sleeping, brace when OOB (if ordered)  Chart, physical therapy assessment, plan of care and goals were reviewed. ASSESSMENT  Patient continues with skilled PT services and is progressing towards goals. Patient this afternoon with good tolerance and participation to increased ambulation training of 150ft. LE doppler - for DVT per preliminary results. Patient progressing gait speed this afternoon as well as step through gait pattern. Heart rate max 106 bpm with spo2 90-92%. Patient denies shortness of breath and palpitations. Anticipate she will clear in AM.     Current Level of Function Impacting Discharge (mobility/balance): SBA with RW    Other factors to consider for discharge: hemovac +         PLAN :  Patient continues to benefit from skilled intervention to address the above impairments. Continue treatment per established plan of care. to address goals. Recommendation for discharge: (in order for the patient to meet his/her long term goals)  Physical therapy at least 2 days/week in the home     This discharge recommendation:  Has been made in collaboration with the attending provider and/or case management    IF patient discharges home will need the following DME: patient owns DME required for discharge       SUBJECTIVE:   Patient stated .    OBJECTIVE DATA SUMMARY:   Patient received seated in bedside chair, agreeable to participate in PT session. Patient was cleared by nursing to participate in PT session. Critical Behavior:  Neurologic State: Alert  Orientation Level: Oriented X4  Cognition: Appropriate decision making  Safety/Judgement: Awareness of environment, Fall prevention    Spinal diagnosis intervention:  The patient stated 3/3 back precautions when prompted. Reviewed all 3 back precautions, log roll technique, and sitting for 30 minutes at a time. The patient required minimal cues to maintain back precautions during functional activity. Reviewed back brace application and wear schedule.  Brace donned with independence      Functional Mobility Training:    Bed Mobility:  Log Rolling: Supervision  Supine to Sit: Stand-by assistance; Adaptive equipment  Sit to Supine: Supervision  Scooting: Supervision        Transfers:  Sit to Stand: Stand-by assistance  Stand to Sit: Supervision        Bed to Chair: Supervision                    Balance:  Sitting: Intact; Without support  Standing: Intact; With support  Standing - Static: Good  Standing - Dynamic : Good  Ambulation/Gait Training:  Distance (ft): 150 Feet (ft)  Assistive Device: Gait belt;Walker, rolling;Brace/Splint  Ambulation - Level of Assistance: Stand-by assistance        Gait Abnormalities: Decreased step clearance                 Step Length: Left shortened;Right shortened               Therapeutic Exercises:     Pain Rating:  Patient without reports of pain during therapy      Activity Tolerance:   Good, tolerates ADLs without rest breaks, and SpO2 stable on RA    After treatment patient left in no apparent distress:   Supine in bed, Call bell within reach, and Side rails x 3    COMMUNICATION/COLLABORATION:   The patients plan of care was discussed with: Occupational therapist and Registered nurse.      Ok Be PT, DPT   Time Calculation: 27 mins

## 2022-12-16 NOTE — PROGRESS NOTES
Problem: Mobility Impaired (Adult and Pediatric)  Goal: *Acute Goals and Plan of Care (Insert Text)  Description: FUNCTIONAL STATUS PRIOR TO ADMISSION: Patient was independent and active without use of DME.    HOME SUPPORT PRIOR TO ADMISSION: The patient lived with her daughter but did not require assist. Her second daughter will be providing additional assistance upon d /c     Physical Therapy Goals  Initiated 12/14/2022    1. Patient will move from supine to sit and sit to supine , scoot up and down, and roll side to side in bed with modified independence within 4 days. 2. Patient will perform sit to stand with modified independence within 4 days. 3. Patient will ambulate with modified independence for 150 feet with the least restrictive device within 4 days. 4. Patient will ascend/descend 3 stairs with 1-2 handrail(s) with minimal assistance/contact guard assist within 4 days. 5. Patient will verbalize and demonstrate understanding of spinal precautions (No bending, lifting greater than 5 lbs, or twisting; log-roll technique; frequent repositioning as instructed) within 4 days. Outcome: Progressing Towards Goal   PHYSICAL THERAPY TREATMENT  Patient: Bryant Castillo (70 y.o. female)  Date: 12/16/2022  Diagnosis: Pain, lumbar region [M54.50]  Spondylolisthesis of lumbar region [M43.16]  Spinal stenosis, lumbar region, with neurogenic claudication [M48.062]  Lumbar stenosis [M48.061] <principal problem not specified>  Procedure(s) (LRB):  L4-L5 LAMINECTOMY /FUSION / INSTRUMENTATION/TRANSFORAMINAL LUMBAR INTERBODY FUSION (N/A) 3 Days Post-Op  Precautions: Spinal, Fall No bending, no lifting greater than 5 lbs, no twisting, log-roll technique, repositioning every 20-30 min except when sleeping, brace when OOB (if ordered)  Chart, physical therapy assessment, plan of care and goals were reviewed. ASSESSMENT  Patient continues with skilled PT services and is progressing towards goals.  Noted planned doppler study placed this morning. Patient requesting bathroom use this morning and RN cleared for light activity with PT. Attempted to clear with ortho office, however no answer. Set up room for minimal ambulation to bedside commode. Patient tolerates with CGA. Spo2 stable on room air, 89-94% with activity. Heart rate max 105 bpm during activity. Patient denies shortness of breathe and palpitations. Left in bedside chair at conclusion of session. Continue to recommend HHPT. Will advance therapy per plan of care pending doppler study this afternoon. Current Level of Function Impacting Discharge (mobility/balance): CGA with RW; pending doppler    Other factors to consider for discharge: none additional         PLAN :  Patient continues to benefit from skilled intervention to address the above impairments. Continue treatment per established plan of care. to address goals. Recommendation for discharge: (in order for the patient to meet his/her long term goals)  Physical therapy at least 2 days/week in the home     This discharge recommendation:  Has been made in collaboration with the attending provider and/or case management    IF patient discharges home will need the following DME: RW has been delivered for discharge       SUBJECTIVE:   Patient stated no, I'm just breathing .  re: patient denies SOB    OBJECTIVE DATA SUMMARY:   Patient received supine in bed and was agreeable to participate in PT session. Patient was cleared by nursing to participate in PT session. Critical Behavior:  Neurologic State: Alert  Orientation Level: Oriented X4  Cognition: Appropriate decision making  Safety/Judgement: Awareness of environment, Fall prevention    Spinal diagnosis intervention:  The patient stated 3/3 back precautions when prompted. Reviewed all 3 back precautions, log roll technique, and sitting for 30 minutes at a time.  The patient required minimal cues to maintain back precautions during functional activity. Reviewed back brace application and wear schedule. Brace donned with supervision/set-up      Functional Mobility Training:    Bed Mobility:  Log Rolling: Stand-by assistance; Adaptive equipment  Supine to Sit: Stand-by assistance; Adaptive equipment     Scooting: Stand-by assistance        Transfers:  Sit to Stand: Contact guard assistance  Stand to Sit: Contact guard assistance        Bed to Chair: Contact guard assistance                    Balance:  Sitting: Intact; With support  Standing: With support; Intact  Ambulation/Gait Training:  Distance (ft): 5 Feet (ft)  Assistive Device: Gait belt;Brace/Splint; Walker, rolling  Ambulation - Level of Assistance: Contact guard assistance        Gait Abnormalities: Decreased step clearance                 Step Length: Right shortened;Left shortened                  Therapeutic Exercises:     Pain Rating:  Patient without reports of pain during therapy      Activity Tolerance:   Good and tolerates ADLs without rest breaks    After treatment patient left in no apparent distress:   Sitting in chair and Call bell within reach    COMMUNICATION/COLLABORATION:   The patients plan of care was discussed with: Occupational therapist and Registered nurse.      Robinson Alba PT, DPT   Time Calculation: 21 mins

## 2022-12-17 VITALS
HEART RATE: 86 BPM | BODY MASS INDEX: 37.56 KG/M2 | WEIGHT: 233.69 LBS | HEIGHT: 66 IN | SYSTOLIC BLOOD PRESSURE: 143 MMHG | DIASTOLIC BLOOD PRESSURE: 76 MMHG | RESPIRATION RATE: 18 BRPM | OXYGEN SATURATION: 93 % | TEMPERATURE: 98.1 F

## 2022-12-17 LAB
ANION GAP SERPL CALC-SCNC: 6 MMOL/L (ref 5–15)
BUN SERPL-MCNC: 24 MG/DL (ref 6–20)
BUN/CREAT SERPL: 22 (ref 12–20)
CALCIUM SERPL-MCNC: 8.8 MG/DL (ref 8.5–10.1)
CHLORIDE SERPL-SCNC: 104 MMOL/L (ref 97–108)
CO2 SERPL-SCNC: 29 MMOL/L (ref 21–32)
CREAT SERPL-MCNC: 1.07 MG/DL (ref 0.55–1.02)
GLUCOSE SERPL-MCNC: 108 MG/DL (ref 65–100)
HGB BLD-MCNC: 9.4 G/DL (ref 11.5–16)
POTASSIUM SERPL-SCNC: 3.4 MMOL/L (ref 3.5–5.1)
SODIUM SERPL-SCNC: 139 MMOL/L (ref 136–145)

## 2022-12-17 PROCEDURE — 74011250637 HC RX REV CODE- 250/637: Performed by: INTERNAL MEDICINE

## 2022-12-17 PROCEDURE — 94761 N-INVAS EAR/PLS OXIMETRY MLT: CPT

## 2022-12-17 PROCEDURE — 36415 COLL VENOUS BLD VENIPUNCTURE: CPT

## 2022-12-17 PROCEDURE — 74011250637 HC RX REV CODE- 250/637: Performed by: ORTHOPAEDIC SURGERY

## 2022-12-17 PROCEDURE — 97530 THERAPEUTIC ACTIVITIES: CPT

## 2022-12-17 PROCEDURE — 80048 BASIC METABOLIC PNL TOTAL CA: CPT

## 2022-12-17 PROCEDURE — 74011250637 HC RX REV CODE- 250/637: Performed by: NURSE PRACTITIONER

## 2022-12-17 PROCEDURE — 74011000250 HC RX REV CODE- 250: Performed by: ORTHOPAEDIC SURGERY

## 2022-12-17 PROCEDURE — 85018 HEMOGLOBIN: CPT

## 2022-12-17 PROCEDURE — 97116 GAIT TRAINING THERAPY: CPT

## 2022-12-17 PROCEDURE — 74011250637 HC RX REV CODE- 250/637: Performed by: PHYSICIAN ASSISTANT

## 2022-12-17 RX ORDER — AMOXICILLIN 250 MG
1 CAPSULE ORAL
Qty: 60 TABLET | Refills: 1 | Status: SHIPPED | OUTPATIENT
Start: 2022-12-17

## 2022-12-17 RX ORDER — POTASSIUM CHLORIDE 750 MG/1
20 TABLET, FILM COATED, EXTENDED RELEASE ORAL
Status: COMPLETED | OUTPATIENT
Start: 2022-12-17 | End: 2022-12-17

## 2022-12-17 RX ORDER — NALOXONE HYDROCHLORIDE 4 MG/.1ML
SPRAY NASAL
Qty: 2 EACH | Refills: 5 | Status: SHIPPED | OUTPATIENT
Start: 2022-12-17

## 2022-12-17 RX ORDER — CYCLOBENZAPRINE HCL 10 MG
10 TABLET ORAL
Qty: 60 TABLET | Refills: 2 | Status: SHIPPED | OUTPATIENT
Start: 2022-12-17

## 2022-12-17 RX ORDER — HYDROMORPHONE HYDROCHLORIDE 2 MG/1
2-4 TABLET ORAL
Qty: 60 TABLET | Refills: 0 | Status: SHIPPED | OUTPATIENT
Start: 2022-12-17 | End: 2022-12-24

## 2022-12-17 RX ORDER — ASPIRIN 81 MG/1
81 TABLET ORAL DAILY
Status: DISCONTINUED | OUTPATIENT
Start: 2022-12-17 | End: 2022-12-17 | Stop reason: HOSPADM

## 2022-12-17 RX ADMIN — Medication 10 ML: at 05:05

## 2022-12-17 RX ADMIN — SENNOSIDES AND DOCUSATE SODIUM 1 TABLET: 50; 8.6 TABLET ORAL at 09:29

## 2022-12-17 RX ADMIN — POLYETHYLENE GLYCOL 3350 17 G: 17 POWDER, FOR SOLUTION ORAL at 09:29

## 2022-12-17 RX ADMIN — DILTIAZEM HYDROCHLORIDE 120 MG: 120 CAPSULE, COATED, EXTENDED RELEASE ORAL at 09:28

## 2022-12-17 RX ADMIN — LABETALOL HYDROCHLORIDE 200 MG: 100 TABLET, FILM COATED ORAL at 09:29

## 2022-12-17 RX ADMIN — ASPIRIN 81 MG: 81 TABLET, COATED ORAL at 16:22

## 2022-12-17 RX ADMIN — HYDROMORPHONE HYDROCHLORIDE 4 MG: 2 TABLET ORAL at 13:59

## 2022-12-17 RX ADMIN — POTASSIUM CHLORIDE 20 MEQ: 750 TABLET, FILM COATED, EXTENDED RELEASE ORAL at 16:22

## 2022-12-17 RX ADMIN — HYDROMORPHONE HYDROCHLORIDE 4 MG: 2 TABLET ORAL at 05:04

## 2022-12-17 RX ADMIN — AMLODIPINE BESYLATE 5 MG: 5 TABLET ORAL at 09:28

## 2022-12-17 RX ADMIN — LOSARTAN POTASSIUM 100 MG: 50 TABLET, FILM COATED ORAL at 09:28

## 2022-12-17 RX ADMIN — HYDROMORPHONE HYDROCHLORIDE 4 MG: 2 TABLET ORAL at 17:52

## 2022-12-17 RX ADMIN — HYDROMORPHONE HYDROCHLORIDE 4 MG: 2 TABLET ORAL at 09:28

## 2022-12-17 RX ADMIN — CHOLECALCIFEROL TAB 125 MCG (5000 UNIT) 5000 UNITS: 125 TAB at 09:29

## 2022-12-17 RX ADMIN — HYDRALAZINE HYDROCHLORIDE 25 MG: 25 TABLET, FILM COATED ORAL at 09:28

## 2022-12-17 RX ADMIN — FAMOTIDINE 20 MG: 20 TABLET, FILM COATED ORAL at 09:28

## 2022-12-17 NOTE — PROGRESS NOTES
ORTHOPAEDIC LUMBAR FUSION PROGRESS NOTE    NAME:     Blane Flores   :       1970   MRN:       [id number]   DATE:      2022    POD:    4 Days Post-Op  S/P:    Procedure(s):  L4-L5 LAMINECTOMY Jude Roque / INSTRUMENTATION/TRANSFORAMINAL LUMBAR INTERBODY FUSION    SUBJECTIVE:    C/O back pain along surgical incision, numbness in R lower leg and foot is improved (now intermittent)   No leg pain  Denies nausea/vomiting, headache, dizziness, cough, chest pain or shortness of breath  Pt reports that she is feeling good today and would like to be discharged home today  Pain controlled    Recent Labs     22  0320   HGB 9.4*      K 3.4*      CO2 29   BUN 24*   CREA 1.07*   *     Patient Vitals for the past 12 hrs:   BP Temp Pulse Resp SpO2   22 1159 125/68 98.2 °F (36.8 °C) 78 18 95 %   22 0653 (!) 146/79 98.2 °F (36.8 °C) 80 18 92 %   22 0303 137/66 98.2 °F (36.8 °C) 78 18 94 %       EXAM:  Dressings clean, dry and intact   Positive strength/ROM bilat lower ext. Neuro intact to sensation  Calves, soft & nontender  BL LEs NVID    Resting comfortably.  Unlabored breathing, speaking in complete sentences    PLAN:  Continue prn PO pain medications  Monitor hemovac drain output  Maintain SCDs  PT/OT, OOB w/ assist  Tolerating diet  Likely d/c home today  Pt can resume anticoagulation today per Dr. Susan Mccurdy, [place]  Orthopaedic Surgery  Physician Assistant to Dr. Geneva Encinas

## 2022-12-17 NOTE — DISCHARGE INSTRUCTIONS
Lumbar Spinal Surgery Discharge Instructions   Dr. Cherry Padron  [phone]    Activity:    You are going home a well person, be as active as possible. Your only exercise should be walking. Start with short frequent walks and increase your walking distance each day. Start with walking twice a day for 5 minutes and increase your distance each day 2-3 minutes until you reach 25 minutes twice a day. Limit the amount of time you sit to 20-30 minute intervals. Sitting for prolonged periods of time will be uncomfortable for you following your surgery. No bending, lifting (of 5lbs or more), twisting, or straining. Do not drive until your doctor states you may do so. However, you may ride in a car for short distances. If you are required to wear a brace, you should wear it at all times when you are out of bed. You may remove it when sleeping unless your physician advises you against it. When you are in the bed, you may lay on your back or on either side. Do not lie on your stomach. You may resume sexual relations 3-4 weeks after surgery depending on how you are feeling. Continue to use your incentive spirometer for deep breathing exercises. Driving: You may not drive or return to work until instructed by your physician. However, you may ride in the car for short periods of time. Brace: If you have a back brace, you should wear your brace at all times when you are out of bed. Do not wear the brace while in bed or showering. Remember to always wear a cotton t-shirt underneath your brace. Do not bend or twist when your brace is off. Diet:  You may resume your regular home diet as tolerated. If your throat is sore, you should eat soft foods for the first couple of days. Be sure to drink plenty of fluids; it is important to keep yourself hydrated. Avoid alcoholic beverages and ABSOLUTELY NO tobacco products. Tobacco products will interfere with your healing.  If you continue to use tobacco, you may end up needing another surgery in the future. Dressing: You have an Optifoam dressing. This is a waterproof bacteriostatic dressing that  requires no post-operative care. It can remain in place for up to 7 days as long as the dressing is intact. Under your Optifoam dressing, your incision is covered with Dermabond surgical skin glue. Please do not peel this off, or apply any oil based products, as they may expedite the deterioration of the glue. The Dermabond will slowly chip off on its own. Do not rub or apply lotion or ointments to incision site. Shower: You may shower 5 days after your surgery. You may remove your brace during showers. NO, Do not use tub baths, swimming pools or Jacuzzis. Medications:  Check with your physician before taking any anti-inflammatory medications. (Advil, Aleve, Ibuprofen, Aspirin)  Take your pain medication as directed  Do NOT take additional Tylenol if your prescribed pain medication has acetaminophen in it (Endocet/Percocet, Lortab, Norco)  It is important to have regular bowel movements. Pain medications can be constipating. Stool softeners, warm prune juice, increasing your water intake, and increasing fiber in your diet can help in preventing constipation. Do NOT take laxatives if at all possible except in severe situations. It can result in a vicious cycle of constipation and diarrhea. Follow-Up   Please call ASAP to schedule your 1st post-operative appointment. This should be approximately 3 weeks from your surgery date. Notify your physician in you develop any of the following conditions:  Fever above 101 degrees for 24 hours. Nausea or vomiting. Severe headache. Inability to urinate  Loss of bowel or bladder function (sudden onset of incontinence)  Changes in sensation in your extremities (numbness, tingling, loss of color). Severe pain or pain not relieved by medications.   Redness, swelling, or drainage from your incision. Persistent pain in the chest.   Pain in the calf of either leg. Increased weakness (if this is greater than before your surgery). If you have any questions about your dressing contact your Orthopaedic Surgeons office. ** IMPORTANT: UNDER YOUR OPTIFOAM DRESSING, YOU HAVE DERMABOND (SURGICAL GLUE) DIRECTLY OVER YOUR INCISION. DO NOT REMOVE THIS. NO ONE ELSE SHOULD REMOVE IT EITHER. IT WILL COME OFF ON ITS OWN OVER TIME    YOUR OPTIFOAM DRESSING SHOULD REMAIN IN PLACE FOR 1 WEEK FROM PLACEMENT. IT IS A WATERPROOF DRESSING AS LONG AS IT'S PLACEMENT IS INTACT. YOU CAN SHOWER AS INDICATED ABOVE IN YOUR DISCHARGE INSTRUCTIONS    * WEAR YOUR BRACE AS ADVISED    * NO DRIVING UNTIL YOU ARE CLEARED TO DO SO BY YOUR SURGEON    * LIMIT LIFTING, BENDING AND TWISTING. NO LIFTING MORE THAN 5 LBS    * NO SWIMMING    * MAKE SURE YOU ARE GETTING GOOD NUTRITION (Lean Protein, Vitamin D AND Calcium)    * DO NOT TAKE ANY NSAIDs FOR THE FIRST 3 MONTHS AFTER SURGERY (such as Advil/Ibuprofen/Motrin, Aleve/Naproxen/Naprosyn, Diclofenac, Celebrex, Meloxicam, Indomethacin, Goody's powder, BC powder etc.)    * NO NICOTINE PRODUCTS       * FULLY READ YOUR DISCHARGE INSTRUCTIONS    FOR PRESCRIPTION REFILLS, DR. Yon Beltre OFFICE NEEDS TO BE CONTACTED 24-48 HOURS PRIOR TO YOU RUNNING OUT OF YOUR MEDICATION(S).  PLEASE KEEP IN MIND THAT PRESCRIPTIONS CANNOT ALWAYS BE REFILLED ON MONDAYS AND TUESDAYS DUE TO OUR OR SCHEDULE    OFFICE OF DR. Yulia Merritt     [address] [facility], [facility] [address], [address] [facility], [address] [facility]   DIRECT OFFICE PHONE NUMBER: [phone]  FAX: [phone]

## 2022-12-17 NOTE — PROGRESS NOTES
Problem: Mobility Impaired (Adult and Pediatric)  Goal: *Acute Goals and Plan of Care (Insert Text)  Description: FUNCTIONAL STATUS PRIOR TO ADMISSION: Patient was independent and active without use of DME.    HOME SUPPORT PRIOR TO ADMISSION: The patient lived with her daughter but did not require assist. Her second daughter will be providing additional assistance upon d /c     Physical Therapy Goals  Initiated 12/14/2022    1. Patient will move from supine to sit and sit to supine , scoot up and down, and roll side to side in bed with modified independence within 4 days. 2. Patient will perform sit to stand with modified independence within 4 days. 3. Patient will ambulate with modified independence for 150 feet with the least restrictive device within 4 days. 4. Patient will ascend/descend 3 stairs with 1-2 handrail(s) with minimal assistance/contact guard assist within 4 days. 5. Patient will verbalize and demonstrate understanding of spinal precautions (No bending, lifting greater than 5 lbs, or twisting; log-roll technique; frequent repositioning as instructed) within 4 days. Outcome: Progressing Towards Goal  Note:   PHYSICAL THERAPY TREATMENT  Patient: Lilia Dunlap (56 y.o. female)  Date: 12/17/2022  Diagnosis: Pain, lumbar region [M54.50]  Spondylolisthesis of lumbar region [M43.16]  Spinal stenosis, lumbar region, with neurogenic claudication [M48.062]  Lumbar stenosis [M48.061] <principal problem not specified>  Procedure(s) (LRB):  L4-L5 LAMINECTOMY /FUSION / INSTRUMENTATION/TRANSFORAMINAL LUMBAR INTERBODY FUSION (N/A) 4 Days Post-Op  Precautions: Spinal, Fall No bending, no lifting greater than 5 lbs, no twisting, log-roll technique, repositioning every 20-30 min except when sleeping, brace when OOB (if ordered)  Chart, physical therapy assessment, plan of care and goals were reviewed. ASSESSMENT  Patient continues with skilled PT services and is progressing towards goals.  SBA for functional transfers and gait training using RW with good adherence to back precautions. Verbalized understanding of wear schedule for LSO and able to don/doff independently. No knee buckling during gait training on level surfaces using RW, ascend/descend 3 step using single handrail support. Daughter present and supportive throughout session . O2 sat 91% on room air with activity, HR 95 pt denies SOB. Reviewed proper car tranfers. Pt is cleared for discharge from PT standpoint when medically stable, hemovac still in place    Current Level of Function Impacting Discharge (mobility/balance): SBA    Other factors to consider for discharge:          PLAN :  Patient continues to benefit from skilled intervention to address the above impairments. Continue treatment per established plan of care. to address goals. Recommendation for discharge: (in order for the patient to meet his/her long term goals)  Physical therapy at least 2 days/week in the home     This discharge recommendation:  Has been made in collaboration with the attending provider and/or case management    IF patient discharges home will need the following DME: RW has been delivered for discharge       SUBJECTIVE:   Patient stated doing ok.     OBJECTIVE DATA SUMMARY:   Critical Behavior:  Neurologic State: Alert  Orientation Level: Oriented X4  Cognition: Appropriate decision making  Safety/Judgement: Awareness of environment, Fall prevention    Spinal diagnosis intervention:  The patient stated 3/3 back precautions when prompted. Reviewed all 3 back precautions, log roll technique, and sitting for 30 minutes at a time. The patient required few cues to maintain back precautions during functional activity. Reviewed back brace application and wear schedule.  Brace donned with supervision/set-up      Functional Mobility Training:    Bed Mobility:  Log    Supine to Sit: Stand-by assistance     Scooting: Stand-by assistance        Transfers:  Sit to Stand: Stand-by assistance  Stand to Sit: Supervision                             Balance:  Sitting: Intact  Standing: Intact; With support  Standing - Static: Constant support;Good  Standing - Dynamic : Constant support;Good  Ambulation/Gait Training:  Distance (ft): 150 Feet (ft)  Assistive Device: Walker, rolling;Gait belt;Brace/Splint  Ambulation - Level of Assistance: Stand-by assistance        Gait Abnormalities: Decreased step clearance              Speed/Grisel: Pace decreased (<100 feet/min)                       Stairs:  Number of Stairs Trained: 3  Stairs - Level of Assistance: Contact guard assistance   Rail Use: Right     Therapeutic Exercises:     Pain Ratin/10    Activity Tolerance:   Good    After treatment patient left in no apparent distress:   Sitting in chair, Call bell within reach, Bed / chair alarm activated, and Caregiver / family present    COMMUNICATION/COLLABORATION:   The patients plan of care was discussed with: Registered nurseBean Moore   Time Calculation: 26 mins             Problem: Mobility Impaired (Adult and Pediatric)  Goal: *Acute Goals and Plan of Care (Insert Text)  Description: FUNCTIONAL STATUS PRIOR TO ADMISSION: Patient was independent and active without use of DME.    HOME SUPPORT PRIOR TO ADMISSION: The patient lived with her daughter but did not require assist. Her second daughter will be providing additional assistance upon d /c     Physical Therapy Goals  Initiated 2022    1. Patient will move from supine to sit and sit to supine , scoot up and down, and roll side to side in bed with modified independence within 4 days. 2. Patient will perform sit to stand with modified independence within 4 days. 3. Patient will ambulate with modified independence for 150 feet with the least restrictive device within 4 days. 4. Patient will ascend/descend 3 stairs with 1-2 handrail(s) with minimal assistance/contact guard assist within 4 days.   5. Patient will verbalize and demonstrate understanding of spinal precautions (No bending, lifting greater than 5 lbs, or twisting; log-roll technique; frequent repositioning as instructed) within 4 days. Outcome: Progressing Towards Goal  Note:   PHYSICAL THERAPY TREATMENT  Patient: Arlene Hull (89 y.o. female)  Date: 12/17/2022  Diagnosis: Pain, lumbar region [M54.50]  Spondylolisthesis of lumbar region [M43.16]  Spinal stenosis, lumbar region, with neurogenic claudication [M48.062]  Lumbar stenosis [M48.061] <principal problem not specified>  Procedure(s) (LRB):  L4-L5 LAMINECTOMY /FUSION / INSTRUMENTATION/TRANSFORAMINAL LUMBAR INTERBODY FUSION (N/A) 4 Days Post-Op  Precautions: Spinal, Fall No bending, no lifting greater than 5 lbs, no twisting, log-roll technique, repositioning every 20-30 min except when sleeping, brace when OOB (if ordered)  Chart, physical therapy assessment, plan of care and goals were reviewed. ASSESSMENT  Patient continues with skilled PT services and is progressing towards goals. SBA for functional transfers and gait training using RW with good adherence to back precautions. Verbalized understanding of wear schedule for LSO and able to don/doff independently. No knee buckling during gait training on level surfaces using RW, ascend/descend 3 step using single handrail support. Daughter present and supportive throughout session . O2 sat 91% on room air with activity, HR 95 pt denies SOB. Reviewed proper car tranfers. Pt is cleared for discharge from PT standpoint when medically stable, hemovac still in place    Current Level of Function Impacting Discharge (mobility/balance): SBA    Other factors to consider for discharge:          PLAN :  Patient continues to benefit from skilled intervention to address the above impairments. Continue treatment per established plan of care. to address goals.     Recommendation for discharge: (in order for the patient to meet his/her long term goals)  Physical therapy at least 2 days/week in the home     This discharge recommendation:  Has been made in collaboration with the attending provider and/or case management    IF patient discharges home will need the following DME: RW has been delivered for discharge       SUBJECTIVE:   Patient stated doing ok.     OBJECTIVE DATA SUMMARY:   Critical Behavior:  Neurologic State: Alert  Orientation Level: Oriented X4  Cognition: Appropriate decision making  Safety/Judgement: Awareness of environment, Fall prevention    Spinal diagnosis intervention:  The patient stated 3/3 back precautions when prompted. Reviewed all 3 back precautions, log roll technique, and sitting for 30 minutes at a time. The patient required few cues to maintain back precautions during functional activity. Reviewed back brace application and wear schedule. Brace donned with supervision/set-up      Functional Mobility Training:    Bed Mobility:  Log    Supine to Sit: Stand-by assistance     Scooting: Stand-by assistance        Transfers:  Sit to Stand: Stand-by assistance  Stand to Sit: Supervision                             Balance:  Sitting: Intact  Standing: Intact; With support  Standing - Static: Constant support;Good  Standing - Dynamic : Constant support;Good  Ambulation/Gait Training:  Distance (ft): 150 Feet (ft)  Assistive Device: Walker, rolling;Gait belt;Brace/Splint  Ambulation - Level of Assistance: Stand-by assistance        Gait Abnormalities: Decreased step clearance              Speed/Grisel: Pace decreased (<100 feet/min)                       Stairs:  Number of Stairs Trained: 3  Stairs - Level of Assistance: Contact guard assistance   Rail Use: Right     Therapeutic Exercises:     Pain Ratin/10    Activity Tolerance:   Good    After treatment patient left in no apparent distress:   Sitting in chair, Call bell within reach, Bed / chair alarm activated, and Caregiver / family present    COMMUNICATION/COLLABORATION: The patients plan of care was discussed with: Registered nurse.      Becka Bullock   Time Calculation: 26 mins

## 2022-12-17 NOTE — PROGRESS NOTES
Problem: Falls - Risk of  Goal: *Absence of Falls  Description: Document Sofia Fothergill Fall Risk and appropriate interventions in the flowsheet.   Outcome: Progressing Towards Goal  Note: Fall Risk Interventions:  Mobility Interventions: Patient to call before getting OOB         Medication Interventions: Bed/chair exit alarm    Elimination Interventions: Call light in reach, Bed/chair exit alarm              Problem: Patient Education: Go to Patient Education Activity  Goal: Patient/Family Education  Outcome: Progressing Towards Goal     Problem: Pain  Goal: *Control of Pain  Outcome: Progressing Towards Goal     Problem: Patient Education: Go to Patient Education Activity  Goal: Patient/Family Education  Outcome: Progressing Towards Goal     Problem: Patient Education: Go to Patient Education Activity  Goal: Patient/Family Education  Outcome: Progressing Towards Goal     Problem: Complex Spine Procedure:  Day of Surgery  Goal: Off Pathway (Use only if patient is Off Pathway)  Outcome: Progressing Towards Goal  Goal: Activity/Safety  Outcome: Progressing Towards Goal  Goal: Consults, if ordered  Outcome: Progressing Towards Goal  Goal: Diagnostic Test/Procedures  Outcome: Progressing Towards Goal  Goal: Nutrition/Diet  Outcome: Progressing Towards Goal  Goal: Discharge Planning  Outcome: Progressing Towards Goal  Goal: Medications  Outcome: Progressing Towards Goal  Goal: Respiratory  Outcome: Progressing Towards Goal  Goal: Treatments/Interventions/Procedures  Outcome: Progressing Towards Goal  Goal: Psychosocial  Outcome: Progressing Towards Goal  Goal: *Optimal pain control at patient's stated goal  Outcome: Progressing Towards Goal  Goal: *Demonstrates progressive activity  Outcome: Progressing Towards Goal  Goal: *Respiratory status stable  Outcome: Progressing Towards Goal     Problem: Complex Spine Procedure:  Post Op Day 1  Goal: Off Pathway (Use only if patient is Off Pathway)  Outcome: Progressing Towards Goal  Goal: Activity/Safety  Outcome: Progressing Towards Goal  Goal: Consults, if ordered  Outcome: Progressing Towards Goal  Goal: Diagnostic Test/Procedures  Outcome: Progressing Towards Goal  Goal: Nutrition/Diet  Outcome: Progressing Towards Goal  Goal: Discharge Planning  Outcome: Progressing Towards Goal  Goal: Medications  Outcome: Progressing Towards Goal  Goal: Respiratory  Outcome: Progressing Towards Goal  Goal: Treatments/Interventions/Procedures  Outcome: Progressing Towards Goal  Goal: Psychosocial  Outcome: Progressing Towards Goal  Goal: *Progress independence mobility/activities (eg: Mobility precautions)  Outcome: Progressing Towards Goal  Goal: *Verbalizes/demonstrates understanding of proper body mechanics and use of stabilization device if ordered  Outcome: Progressing Towards Goal  Goal: *Optimal pain control at patient's stated goal  Outcome: Progressing Towards Goal  Goal: *Resumes normal function of bladder and bowel  Outcome: Progressing Towards Goal  Goal: *Hemodynamically stable  Outcome: Progressing Towards Goal  Goal: *Tolerating diet  Outcome: Progressing Towards Goal  Goal: *Labs within defined limits  Outcome: Progressing Towards Goal     Problem: Complex Spine Procedure:  Post Op Day 2  Goal: Off Pathway (Use only if patient is Off Pathway)  Outcome: Progressing Towards Goal  Goal: Activity/Safety  Outcome: Progressing Towards Goal  Goal: Diagnostic Test/Procedures  Outcome: Progressing Towards Goal  Goal: Nutrition/Diet  Outcome: Progressing Towards Goal  Goal: Discharge Planning  Outcome: Progressing Towards Goal  Goal: Medications  Outcome: Progressing Towards Goal  Goal: Respiratory  Outcome: Progressing Towards Goal  Goal: Treatments/Interventions/Procedures  Outcome: Progressing Towards Goal  Goal: Psychosocial  Outcome: Progressing Towards Goal  Goal: *Progress independence mobility/activities (eg: Mobility precautions)  Outcome: Progressing Towards Goal  Goal: *Verbalizes/demonstrates understanding of proper body mechanics and use of stabilization device if ordered  Outcome: Progressing Towards Goal  Goal: *Optimal pain control at patient's stated goal  Outcome: Progressing Towards Goal  Goal: *Resumes normal function of bladder and bowel  Outcome: Progressing Towards Goal  Goal: *Hemodynamically stable  Outcome: Progressing Towards Goal  Goal: *Tolerating diet  Outcome: Progressing Towards Goal  Goal: *Labs within defined limits  Outcome: Progressing Towards Goal  Goal: *Able to place stabilization device  Outcome: Progressing Towards Goal     Problem: Complex Spine Procedure:  Post Op Day 3  Goal: Off Pathway (Use only if patient is Off Pathway)  Outcome: Progressing Towards Goal  Goal: Activity/Safety  Outcome: Progressing Towards Goal  Goal: Nutrition/Diet  Outcome: Progressing Towards Goal  Goal: Discharge Planning  Outcome: Progressing Towards Goal  Goal: Medications  Outcome: Progressing Towards Goal  Goal: Respiratory  Outcome: Progressing Towards Goal  Goal: Treatments/Interventions/Procedures  Outcome: Progressing Towards Goal  Goal: Psychosocial  Outcome: Progressing Towards Goal     Problem: Complex Spine Procedure:  Discharge Outcomes  Goal: *Optimal pain control at patient's stated goal  Outcome: Progressing Towards Goal  Goal: *Demonstrates ability to place and remove stabilization device  Outcome: Progressing Towards Goal  Goal: *Progress independence mobility/activities (eg: Mobility precautions)  Outcome: Progressing Towards Goal  Goal: *Resumes normal function of bladder and bowel  Outcome: Progressing Towards Goal  Goal: *Lungs clear or at baseline  Outcome: Progressing Towards Goal  Goal: *Verbalizes name, dosage, time, side effects, and number of days to continue medications  Outcome: Progressing Towards Goal  Goal: *Modified independence with transfers, ambulation on levels with assistance devices, stair climbing, ADL's  Outcome: Progressing Towards Goal  Goal: *Describes follow-up/return visits to physicians  Outcome: Progressing Towards Goal  Goal: *Describes available resources and support systems  Outcome: Progressing Towards Goal  Goal: *Labs within defined limits  Outcome: Progressing Towards Goal  Goal: *Tolerating diet  Outcome: Progressing Towards Goal

## 2022-12-17 NOTE — PROGRESS NOTES
Problem: Complex Spine Procedure:  Post Op Day 3  Goal: Off Pathway (Use only if patient is Off Pathway)  Outcome: Progressing Towards Goal  Goal: Activity/Safety  Outcome: Progressing Towards Goal  Goal: Nutrition/Diet  Outcome: Progressing Towards Goal  Goal: Discharge Planning  Outcome: Progressing Towards Goal  Goal: Medications  Outcome: Progressing Towards Goal  Goal: Respiratory  Outcome: Progressing Towards Goal  Goal: Treatments/Interventions/Procedures  Outcome: Progressing Towards Goal  Goal: Psychosocial  Outcome: Progressing Towards Goal

## 2022-12-18 ENCOUNTER — HOSPITAL ENCOUNTER (EMERGENCY)
Age: 52
Discharge: HOME | End: 2022-12-18
Attending: EMERGENCY MEDICINE
Payer: MEDICAID

## 2022-12-18 VITALS
WEIGHT: 232 LBS | HEART RATE: 98 BPM | OXYGEN SATURATION: 100 % | TEMPERATURE: 97.6 F | RESPIRATION RATE: 16 BRPM | BODY MASS INDEX: 37.28 KG/M2 | HEIGHT: 66 IN | DIASTOLIC BLOOD PRESSURE: 70 MMHG | SYSTOLIC BLOOD PRESSURE: 167 MMHG

## 2022-12-18 DIAGNOSIS — M54.50 LUMBAR PAIN: Primary | ICD-10-CM

## 2022-12-18 PROCEDURE — 74011250636 HC RX REV CODE- 250/636: Performed by: EMERGENCY MEDICINE

## 2022-12-18 PROCEDURE — 99284 EMERGENCY DEPT VISIT MOD MDM: CPT

## 2022-12-18 PROCEDURE — 96375 TX/PRO/DX INJ NEW DRUG ADDON: CPT

## 2022-12-18 PROCEDURE — 96374 THER/PROPH/DIAG INJ IV PUSH: CPT

## 2022-12-18 RX ORDER — HYDROCODONE BITARTRATE AND ACETAMINOPHEN 5; 325 MG/1; MG/1
1 TABLET ORAL
Status: DISCONTINUED | OUTPATIENT
Start: 2022-12-18 | End: 2022-12-18

## 2022-12-18 RX ORDER — ONDANSETRON 2 MG/ML
4 INJECTION INTRAMUSCULAR; INTRAVENOUS ONCE
Status: COMPLETED | OUTPATIENT
Start: 2022-12-18 | End: 2022-12-18

## 2022-12-18 RX ORDER — MORPHINE SULFATE 4 MG/ML
4 INJECTION INTRAVENOUS
Status: COMPLETED | OUTPATIENT
Start: 2022-12-18 | End: 2022-12-18

## 2022-12-18 RX ORDER — HYDROCORTISONE 0.5 %
OINTMENT (GRAM) TOPICAL 3 TIMES DAILY
Qty: 15 G | Refills: 0 | Status: SHIPPED | OUTPATIENT
Start: 2022-12-18 | End: 2022-12-28

## 2022-12-18 RX ADMIN — ONDANSETRON 4 MG: 2 INJECTION INTRAMUSCULAR; INTRAVENOUS at 11:28

## 2022-12-18 RX ADMIN — MORPHINE SULFATE 4 MG: 4 INJECTION INTRAVENOUS at 11:15

## 2022-12-18 NOTE — ED TRIAGE NOTES
Pt arrives to the ER for complaints of back pain and bilateral leg numbness. Pt reports she had a lumbar fusion on Tuesday by Dr. Jumana Vazquez and was discharged yesterday. Pt reports she had the numbness yesterday when she was discharged but reports that the numbness is worse. Denies any loss of bowel or bladder movements. Denies falling or injuring back at home.

## 2022-12-18 NOTE — ED NOTES
Patient to room from triage. Patient stated she has been following spine precautions. Patient denies bending, lifting, twisting, falls, or trauma. Patient stated she thinks the pain is worse after having to do more physical activity by herself being home and that her bed is too high.

## 2022-12-18 NOTE — PROGRESS NOTES
Noted discharge today, medical updates sent to [facility] through the [employer] system. Care Management Interventions  PCP Verified by CM:  Yes Laxmi Sutton MD )  Palliative Care Criteria Met (RRAT>21 & CHF Dx)?: No  Mode of Transport at Discharge: Self (Family)  Transition of Care Consult (CM Consult): Home Health, DME/Supply Assistance  Discharge Durable Medical Equipment: Yes (RW)  Physical Therapy Consult: Yes  Occupational Therapy Consult: Yes  Support Systems: Child(mark anthony)  Confirm Follow Up Transport: Family  The Plan for Transition of Care is Related to the Following Treatment Goals : [facility] [address] [facility]  The Patient and/or Patient Representative was Provided with a Choice of Provider and Agrees with the Discharge Plan?: Yes  Name of the Patient Representative Who was Provided with a Choice of Provider and Agrees with the Discharge Plan: Seema Caceres  Freedom of Choice List was Provided with Basic Dialogue that Supports the Patient's Individualized Plan of Care/Goals, Treatment Preferences and Shares the Quality Data Associated with the Providers?: (S) Yes  Discharge Location  Patient Expects to be Discharged to[de-identified] Home with home health

## 2022-12-18 NOTE — ED NOTES
Patient requesting to sit on edge of bed. Patient educated to use call button when ready to lie down.

## 2022-12-18 NOTE — DISCHARGE INSTRUCTIONS
You were seen in the emergency department for back pain following a procedure. Please take any medications prescribed at this visit as instructed by your orthopedics team.  Please also follow-up with your orthopedist and PCP or return to the emergency department if you experience a worsening of symptoms or any new symptoms that are concerning to you.

## 2022-12-18 NOTE — CONSULTS
Ortho:    Pt presented to ER s/p lumbar fusion post op day 6. Pt was discharged yesterday. Pt states she only took 2 doses of Dilaudid and one muscle relaxer but her pain intensified. Pt states she thinks her bed is too high and very uncomfortable. She states her pain is similar to how it has been over the past 5 years leading up to her surgery. No significant changes to her type of pain. No urinary or bowel incontinence. PT just received IV morphine. VSS. Pt states her pain is better controlled with the IV rmorphine. Lumbar dressing cdi. No drainage. No swelling. BLE with 5/5 strength in the side lying position she was in. Pt did not want to lay on her back. Plan:  Post op pain without complications  Discussed pain management plan with oral dilaudid every 3 hours as needed and muscle relaxation medications. If symptoms worsen including weakness, numbness, pain despite pain medications, to call office or return to ER.     Cecile Ibanez NP

## 2022-12-18 NOTE — ED PROVIDER NOTES
49-year-old female with PMHx of CAD, depression, hypertension, MI status post 2 stents, and lumbar osteoarthritis status post L4-L5 laminectomy and fusion last week presents the emergency department planing of significantly worsened lower back pain and numbness in bilateral lower extremities since her discharge yesterday. Patient notes that she did have some numbness in the lower extremities but that it has worsened since discharge. She reports weakness in bilateral legs, though she notes that this was there when she was in the hospital.  She reports taking Flexeril and Dilaudid at home with limited relief in her pain. The history is provided by the patient and a relative. Back Pain   This is a new problem. The current episode started yesterday. The problem has been gradually worsening. The problem occurs constantly. Patient reports not work related injury. Associated with: recent surgery. The pain is present in the lumbar spine. The quality of the pain is described as stabbing. The pain does not radiate. The pain is at a severity of 10/10. Associated symptoms include numbness. Pertinent negatives include no perianal numbness and no weakness. Treatments tried: hydromorphone. Risk factors include obesity. The patient's surgical history includes   and . Past Medical History:   Diagnosis Date    Arthritis     Osteo?   Lumbar    CAD (coronary artery disease)     Depression 01/21/2022    Major Depressive d/o,panic attacks, chlostrophobia, agoraphobia    Fibroid     Hypertension     Age 14/Resistant    MI (myocardial infarction) ([facility] [address] 04/27/2020    Prox RCA 2 stents    Psychiatric disorder     Vertigo     s/p Concussion 2/17       Past Surgical History:   Procedure Laterality Date    HX GYN      2004 endometrial ablation    HX PARTIAL HYSTERECTOMY  2012    still needs paps    HX TUBAL LIGATION      hysterectomy 2011         Family History:   Problem Relation Age of Onset    Hypertension Father     Diabetes Father 39       Social History     Socioeconomic History    Marital status: SINGLE     Spouse name: Not on file    Number of children: 2    Years of education: Not on file    Highest education level: Not on file   Occupational History    Occupation: mail contractor   Tobacco Use    Smoking status: Never    Smokeless tobacco: Never   Substance and Sexual Activity    Alcohol use: No    Drug use: No    Sexual activity: Not on file   Other Topics Concern    Not on file   Social History Narrative    Not on file     Social Determinants of Health     Financial Resource Strain: Not on file   Food Insecurity: Not on file   Transportation Needs: Not on file   Physical Activity: Not on file   Stress: Not on file   Social Connections: Not on file   Intimate Partner Violence: Not on file   Housing Stability: Not on file         ALLERGIES: Mobic [meloxicam]    Review of Systems   Constitutional: Negative. HENT: Negative. Eyes: Negative. Respiratory: Negative. Cardiovascular: Negative. Gastrointestinal: Negative. Endocrine: Negative. Genitourinary: Negative. Musculoskeletal:  Positive for back pain. Skin: Negative. Neurological:  Positive for numbness. Negative for weakness. Hematological: Negative. Psychiatric/Behavioral: Negative. Vitals:    12/18/22 0931   BP: (!) 183/75   Pulse: 98   Resp: 16   Temp: 97.6 °F (36.4 °C)   SpO2: 97%   Weight: 105.2 kg (232 lb)   Height: 5' 6\" (1.676 m)            Physical Exam  Vitals and nursing note reviewed. Constitutional:       General: She is not in acute distress. Appearance: Normal appearance. She is not ill-appearing. HENT:      Head: Normocephalic and atraumatic. Nose: Nose normal.      Mouth/Throat:      Mouth: Mucous membranes are moist.   Eyes:      Extraocular Movements: Extraocular movements intact. Pupils: Pupils are equal, round, and reactive to light.    Cardiovascular:      Rate and Rhythm: Normal rate and regular rhythm. Pulses: Normal pulses. Pulmonary:      Effort: Pulmonary effort is normal. No respiratory distress. Breath sounds: Normal breath sounds. Abdominal:      General: There is no distension. Palpations: Abdomen is soft. Tenderness: There is no abdominal tenderness. Musculoskeletal:         General: Tenderness present. Normal range of motion. Cervical back: Normal range of motion and neck supple. Skin:     General: Skin is warm and dry. Neurological:      General: No focal deficit present. Mental Status: She is alert and oriented to person, place, and time. Cranial Nerves: No cranial nerve deficit. Motor: No weakness. Coordination: Coordination normal.      Gait: Gait normal.   Psychiatric:         Mood and Affect: Mood normal.        MDM  Number of Diagnoses or Management Options  Lumbar pain: new and requires workup  Diagnosis management comments: DDx: Postop complication, lumbar radiculopathy    This patient presents with back pain most consistent with post op pain. The pt is nontoxic appearing with normal vital signs. No back pain red flags on history or physical. Presentation not consistent with malignancy (lack of history of malignancy, lack of B symptoms), fracture (no trauma, no bony tenderness to palpation), cauda equina (no bowel or urinary incontinence/retention, no saddle anesthesia, no distal weakness), AAA, viscus perforation, osteomyelitis or epidural abscess (no IVDU, vertebral tenderness), renal colic, pyelonephritis (afebrile, no CVAT, no urinary symptoms). Given the clinical picture, no indication for imaging at this time. Will provide analgesics and reassess. Plan:  - Medications: morphine, ondansetron  - Consults: ortho spine    Reassessment: Pt reports improvement in her Sx's with the morphine. Ortho has evaluated her and believes that her neuro exam is consistent with her expected postop course.   They believe that she may safely be discharged at this time with follow-up in their clinic. Discussed this with the patient and she is in agreement.     Risk of Complications, Morbidity, and/or Mortality  Presenting problems: low  Diagnostic procedures: low  Management options: low    Patient Progress  Patient progress: improved         Procedures

## 2022-12-30 NOTE — DISCHARGE SUMMARY
DISCHARGE SUMMARY     Patient: Lucie Tiwari                             Medical Record Number: [id number]                : 1970  Age: 46 y.o. Admit Date: 2022  Discharge Date: 2022  Admission Diagnosis: Pain, lumbar region [M54.50]  Spondylolisthesis of lumbar region [M43.16]  Spinal stenosis, lumbar region, with neurogenic claudication [M48.062]  Lumbar stenosis [M48.061]  Discharge Diagnosis: Pain, lumbar region [M54.50]  Spondylolisthesis of lumbar region [M43.16]  Spinal stenosis, lumbar region, with neurogenic claudication [M48.062]  Procedures: Procedure(s):  L4-L5 LAMINECTOMY [facility] Bartolo / INSTRUMENTATION/TRANSFORAMINAL LUMBAR INTERBODY FUSION  Surgeon: Brandon Crockett MD  Co-surgeon:   Assistants:   Anesthesia: General  Complications: None     History of Present Illness:  Lucie Tiwari is a 46 y.o. female with a history of intractable low back and radiculopathy. Despite conservative management and after clinical and radiographic evaluation, it was determined that she suffered from lumbar stenosis  and lumbar spondylolisthesis and would benefit from Procedure(s):  L4-L5 LAMINECTOMY /FUSION / INSTRUMENTATION/TRANSFORAMINAL LUMBAR INTERBODY FUSION, which she consented to undergo after a discussion of the risks, benefits, alternatives, rehab concerns, and potential complications of surgery. Hospital Course:  Lucie Tiwari tolerated the procedure well. She was transferred  to the recovery room in stable condition. After a brief stay, the patient was then transferred to the Orthopedic floor. On postoperative day #1, the dressing was clean and dry and she was neurovascularly intact. The patient was afebrile and vital signs were stable. Calves were soft and non-tender bilaterally. Lucie Tiwari was noted to have some elevation in her serum creatinine on postoperative day #1. It did not initially improved with intravenous fluids. Nephrology was consulted.  Her creatinine subsequently improved with lactated ringers infusion and holding her oral chlorthalidone. She was medically cleared for discharge by nephrology. Kentrell Hallman made excellent progress with physical therapy and was discharged to Home with Home Health in stable condition on postoperative day 4. She was provided with routine postoperative instructions and advised to follow up in my office in 3 weeks following discharge from the hospital.  She was given prescriptions for medication to control her post-operative symptoms. She will also follow up with Nephrology and her primary care provider outpatient. Discharge Medications:  Discharge Medication List as of 12/17/2022  5:35 PM        START taking these medications    Details   cyclobenzaprine (FLEXERIL) 10 mg tablet Take 1 Tablet by mouth three (3) times daily as needed for Muscle Spasm(s). , Normal, Disp-60 Tablet, R-2      HYDROmorphone (DILAUDID) 2 mg tablet Take 1-2 Tablets by mouth every four (4) hours as needed (moderate or severe pain) for up to 7 days. Max Daily Amount: 24 mg., Normal, Disp-60 Tablet, R-0      senna-docusate (PERICOLACE) 8.6-50 mg per tablet Take 1 Tablet by mouth two (2) times daily as needed for Constipation. , Normal, Disp-60 Tablet, R-1      naloxone (Narcan) 4 mg/actuation nasal spray Apply 1 nasal spray to one nostril; may repeat every 2-3 minutes in alternating nostrils until medical assistance arrives, Normal, Disp-2 Each, R-5Indications: Opiate-induced respiratory depression, Opioid overdose           CONTINUE these medications which have NOT CHANGED    Details   hydrALAZINE (APRESOLINE) 25 mg tablet Take 25 mg by mouth two (2) times a day., Historical Med      atorvastatin (LIPITOR) 20 mg tablet Take  by mouth nightly., Historical Med      losartan (COZAAR) 100 mg tablet Take 100 mg by mouth daily. , Historical Med      ticagrelor (BRILINTA PO) Take 90 mg by mouth two (2) times a day., Historical Med      dilTIAZem ER ANANT [facility]) 120 mg capsule Take 120 mg by mouth daily. , Historical Med      labetaloL (NORMODYNE) 200 mg tablet Take 200 mg by mouth two (2) times a day., Historical Med      aspirin delayed-release 81 mg tablet Take  by mouth daily. , Historical Med      nitroglycerin (NITROSTAT) 0.3 mg SL tablet 0.3 mg by SubLINGual route every five (5) minutes as needed for Chest Pain., Historical Med      amLODIPine (NORVASC) 5 mg tablet Take 5 mg by mouth daily. , Historical Med           STOP taking these medications       chlorthalidone (HYGROTON) 25 mg tablet Comments:   Reason for Stopping:               EDWINA Clarke  12/17/2022  Orthopaedic Surgery  Physician Assistant to Dr. Jennyfer Peguero

## 2023-03-13 ENCOUNTER — TRANSCRIBE ORDER (OUTPATIENT)
Dept: SCHEDULING | Age: 53
End: 2023-03-13

## 2023-03-13 DIAGNOSIS — Z98.1 S/P LUMBAR SPINAL FUSION: Primary | ICD-10-CM

## 2023-03-13 DIAGNOSIS — M54.16 LUMBAR RADICULITIS: ICD-10-CM

## 2023-03-31 ENCOUNTER — HOSPITAL ENCOUNTER (OUTPATIENT)
Dept: MRI IMAGING | Age: 53
Discharge: HOME | End: 2023-03-31
Attending: ORTHOPAEDIC SURGERY
Payer: MEDICAID

## 2023-03-31 DIAGNOSIS — Z98.1 S/P LUMBAR SPINAL FUSION: ICD-10-CM

## 2023-03-31 DIAGNOSIS — M54.16 LUMBAR RADICULITIS: ICD-10-CM

## 2023-03-31 PROCEDURE — 72158 MRI LUMBAR SPINE W/O & W/DYE: CPT

## 2023-03-31 PROCEDURE — A9576 INJ PROHANCE MULTIPACK: HCPCS | Performed by: ORTHOPAEDIC SURGERY

## 2023-03-31 PROCEDURE — 74011250636 HC RX REV CODE- 250/636: Performed by: ORTHOPAEDIC SURGERY

## 2023-03-31 RX ADMIN — GADOTERIDOL 20 ML: 279.3 INJECTION, SOLUTION INTRAVENOUS at 11:44

## 2025-06-18 ENCOUNTER — TRANSCRIBE ORDERS (OUTPATIENT)
Facility: HOSPITAL | Age: 55
End: 2025-06-18

## 2025-06-18 DIAGNOSIS — M25.551 RIGHT HIP PAIN: Primary | ICD-10-CM

## 2025-06-18 DIAGNOSIS — M25.851 FEMOROACETABULAR IMPINGEMENT OF RIGHT HIP: ICD-10-CM

## (undated) DEVICE — ALCOHOL RUBBING ISO 16OZ 70%

## (undated) DEVICE — TOOL 14MH30 LEGEND 14CM 3MM: Brand: MIDAS REX ™

## (undated) DEVICE — OPTIFOAM GENTLE SA, POSTOP, 4X10: Brand: MEDLINE

## (undated) DEVICE — SOLUTION IRRIG 1000ML 0.9% SOD CHL USP POUR PLAS BTL

## (undated) DEVICE — LAMINECTOMY-SFMC: Brand: MEDLINE INDUSTRIES, INC.

## (undated) DEVICE — SEALANT HEMOSTAT W/THROM 8ML -- SURGIFLO MATRIX

## (undated) DEVICE — 4-PORT MANIFOLD: Brand: NEPTUNE 2

## (undated) DEVICE — SUTURE STRATAFIX SPRL SZ 1 L14IN ABSRB VLT L48CM CTX 1/2 SXPD2B405

## (undated) DEVICE — PROBE BALL TIP STIMULATING 25

## (undated) DEVICE — TOTAL TRAY, 16FR 10ML SIL FOLEY, URN: Brand: MEDLINE

## (undated) DEVICE — JACKSON TABLE POSITIONER KIT: Brand: MEDLINE INDUSTRIES, INC.

## (undated) DEVICE — GLOVE SURG SZ 65 THK91MIL LTX FREE SYN POLYISOPRENE

## (undated) DEVICE — THE MILL DISPOSABLE - MEDIUM

## (undated) DEVICE — TIP CLEANER: Brand: VALLEYLAB

## (undated) DEVICE — CATHETER IV 14GA L1.25IN TEF STR HUB INTROCAN SFTY

## (undated) DEVICE — SPONGE GZ W4XL4IN COT 12 PLY TYP VII WVN C FLD DSGN

## (undated) DEVICE — ACCY PA100-A LEGEND LUB/DIFFUSER 4 PACK: Brand: MIDAS REX®

## (undated) DEVICE — 3M™ TEGADERM™ TRANSPARENT FILM DRESSING FRAME STYLE, 1624W, 2-3/8 IN X 2-3/4 IN (6 CM X 7 CM), 100/CT 4CT/CASE: Brand: 3M™ TEGADERM™

## (undated) DEVICE — DERMABOND SKIN ADH 0.7ML -- DERMABOND ADVANCED 12/BX

## (undated) DEVICE — SUTURE VCRL SZ 1 L36IN ABSRB UD L36MM CT-1 1/2 CIR J947H

## (undated) DEVICE — SUT VCRL 2-0 36IN CT1 UD --

## (undated) DEVICE — GLOVE ORTHO 8   MSG9480

## (undated) DEVICE — GLOVE SURG SZ 7 L12IN FNGR THK79MIL GRN LTX FREE

## (undated) DEVICE — STRAP,POSITIONING,KNEE/BODY,FOAM,4X60": Brand: MEDLINE

## (undated) DEVICE — AEGIS 1" DISK 4MM HOLE, PEEL OPEN: Brand: MEDLINE

## (undated) DEVICE — Device: Brand: JELCO

## (undated) DEVICE — DRAIN KT WND 10FR RND 400ML --

## (undated) DEVICE — SUTURE ABSORBABLE 3-0 PS-1 18 IN UD MONOCRYL + STRATAFIX SXMP1B102

## (undated) DEVICE — GOWN,SIRUS,NONRNF,SETINSLV,XL,20/CS: Brand: MEDLINE

## (undated) DEVICE — SCR BNE SPNE 6.5X45MM TI -- STREAMLINE TL
Type: IMPLANTABLE DEVICE | Site: SPINE LUMBAR | Status: NON-FUNCTIONAL
Removed: 2022-12-13

## (undated) DEVICE — CONNEXT SURGICAL MATRIX 0.030 OZ.

## (undated) DEVICE — 3M™ TEGADERM™ TRANSPARENT FILM DRESSING FRAME STYLE, 1626, 4 IN X 4-3/4 IN (10 CM X 12 CM), 50/CT 4CT/CASE: Brand: 3M™ TEGADERM™

## (undated) DEVICE — 450 ML BOTTLE OF 0.05% CHLORHEXIDINE GLUCONATE IN 99.95% STERILE WATER FOR IRRIGATION, USP AND APPLICATOR.: Brand: IRRISEPT ANTIMICROBIAL WOUND LAVAGE

## (undated) DEVICE — SOLUTION IRRIG 1000ML STRL H2O USP PLAS POUR BTL

## (undated) DEVICE — GLOVE SURG SZ 8 L12IN FNGR THK79MIL GRN LTX FREE

## (undated) DEVICE — SUTURE VCRL SZ 0 L36IN ABSRB UD L36MM CT-1 1/2 CIR J946H

## (undated) DEVICE — SUTURE VCRL SZ 2-0 L27IN ABSRB UD L26MM CT-2 1/2 CIR J269H

## (undated) DEVICE — GLOVE ORANGE PI 7 1/2   MSG9075